# Patient Record
Sex: FEMALE | Race: ASIAN | NOT HISPANIC OR LATINO | Employment: FULL TIME
[De-identification: names, ages, dates, MRNs, and addresses within clinical notes are randomized per-mention and may not be internally consistent; named-entity substitution may affect disease eponyms.]

---

## 2018-04-02 ENCOUNTER — HEALTH MAINTENANCE LETTER (OUTPATIENT)
Age: 58
End: 2018-04-02

## 2018-04-09 ENCOUNTER — OFFICE VISIT (OUTPATIENT)
Dept: FAMILY MEDICINE | Facility: CLINIC | Age: 58
End: 2018-04-09
Payer: COMMERCIAL

## 2018-04-09 VITALS
DIASTOLIC BLOOD PRESSURE: 81 MMHG | WEIGHT: 128 LBS | HEIGHT: 60 IN | BODY MASS INDEX: 25.13 KG/M2 | HEART RATE: 92 BPM | OXYGEN SATURATION: 100 % | SYSTOLIC BLOOD PRESSURE: 139 MMHG | TEMPERATURE: 98.5 F

## 2018-04-09 DIAGNOSIS — I10 HYPERTENSION, UNSPECIFIED TYPE: ICD-10-CM

## 2018-04-09 DIAGNOSIS — E11.9 TYPE 2 DIABETES MELLITUS WITHOUT COMPLICATION, WITHOUT LONG-TERM CURRENT USE OF INSULIN (H): Primary | ICD-10-CM

## 2018-04-09 DIAGNOSIS — Z12.11 SCREEN FOR COLON CANCER: ICD-10-CM

## 2018-04-09 DIAGNOSIS — Z12.31 VISIT FOR SCREENING MAMMOGRAM: ICD-10-CM

## 2018-04-09 DIAGNOSIS — E78.2 MIXED HYPERLIPIDEMIA: ICD-10-CM

## 2018-04-09 DIAGNOSIS — Z11.59 NEED FOR HEPATITIS C SCREENING TEST: ICD-10-CM

## 2018-04-09 LAB
ALBUMIN SERPL-MCNC: 3.8 G/DL (ref 3.4–5)
ALP SERPL-CCNC: 53 U/L (ref 40–150)
ALT SERPL W P-5'-P-CCNC: 22 U/L (ref 0–50)
ANION GAP SERPL CALCULATED.3IONS-SCNC: 5 MMOL/L (ref 3–14)
AST SERPL W P-5'-P-CCNC: 16 U/L (ref 0–45)
BILIRUB SERPL-MCNC: 0.3 MG/DL (ref 0.2–1.3)
BUN SERPL-MCNC: 19 MG/DL (ref 7–30)
CALCIUM SERPL-MCNC: 9.3 MG/DL (ref 8.5–10.1)
CHLORIDE SERPL-SCNC: 104 MMOL/L (ref 94–109)
CO2 SERPL-SCNC: 30 MMOL/L (ref 20–32)
CREAT SERPL-MCNC: 0.53 MG/DL (ref 0.52–1.04)
CREAT UR-MCNC: 91 MG/DL
GFR SERPL CREATININE-BSD FRML MDRD: >90 ML/MIN/1.7M2
GLUCOSE BLD-MCNC: 218 MG/DL (ref 70–99)
GLUCOSE SERPL-MCNC: 218 MG/DL (ref 70–99)
HBA1C MFR BLD: 9.2 % (ref 0–6.4)
HCV AB SERPL QL IA: NONREACTIVE
MICROALBUMIN UR-MCNC: 17 MG/L
MICROALBUMIN/CREAT UR: 18.39 MG/G CR (ref 0–25)
POTASSIUM SERPL-SCNC: 4.3 MMOL/L (ref 3.4–5.3)
PROT SERPL-MCNC: 7.4 G/DL (ref 6.8–8.8)
SODIUM SERPL-SCNC: 139 MMOL/L (ref 133–144)
TSH SERPL DL<=0.005 MIU/L-ACNC: 0.4 MU/L (ref 0.4–4)

## 2018-04-09 PROCEDURE — 99204 OFFICE O/P NEW MOD 45 MIN: CPT | Performed by: INTERNAL MEDICINE

## 2018-04-09 PROCEDURE — 36415 COLL VENOUS BLD VENIPUNCTURE: CPT | Performed by: INTERNAL MEDICINE

## 2018-04-09 PROCEDURE — 86803 HEPATITIS C AB TEST: CPT | Performed by: INTERNAL MEDICINE

## 2018-04-09 PROCEDURE — 83036 HEMOGLOBIN GLYCOSYLATED A1C: CPT | Performed by: INTERNAL MEDICINE

## 2018-04-09 PROCEDURE — 80053 COMPREHEN METABOLIC PANEL: CPT | Performed by: INTERNAL MEDICINE

## 2018-04-09 PROCEDURE — 80061 LIPID PANEL: CPT | Performed by: INTERNAL MEDICINE

## 2018-04-09 PROCEDURE — 84443 ASSAY THYROID STIM HORMONE: CPT | Performed by: INTERNAL MEDICINE

## 2018-04-09 PROCEDURE — 82043 UR ALBUMIN QUANTITATIVE: CPT | Performed by: INTERNAL MEDICINE

## 2018-04-09 PROCEDURE — 82947 ASSAY GLUCOSE BLOOD QUANT: CPT | Mod: 59 | Performed by: INTERNAL MEDICINE

## 2018-04-09 RX ORDER — GLYBURIDE-METFORMIN HYDROCHLORIDE 5; 500 MG/1; MG/1
1 TABLET ORAL 2 TIMES DAILY WITH MEALS
Qty: 180 TABLET | Refills: 1 | Status: SHIPPED | OUTPATIENT
Start: 2018-04-09 | End: 2018-10-10

## 2018-04-09 RX ORDER — SIMVASTATIN 10 MG
10 TABLET ORAL
COMMUNITY
Start: 2017-09-29 | End: 2018-10-10

## 2018-04-09 RX ORDER — LISINOPRIL 10 MG/1
TABLET ORAL
Refills: 0 | COMMUNITY
Start: 2017-12-20 | End: 2018-04-10

## 2018-04-09 RX ORDER — INSULIN PUMP SYRINGE, 3 ML
EACH MISCELLANEOUS
COMMUNITY
Start: 2017-06-09

## 2018-04-09 RX ORDER — GLYBURIDE-METFORMIN HYDROCHLORIDE 5; 500 MG/1; MG/1
1 TABLET ORAL 2 TIMES DAILY WITH MEALS
COMMUNITY
End: 2018-04-10

## 2018-04-09 ASSESSMENT — PAIN SCALES - GENERAL: PAINLEVEL: NO PAIN (0)

## 2018-04-09 NOTE — MR AVS SNAPSHOT
After Visit Summary   4/9/2018    Alfa Sung    MRN: [id number]           Patient Information     Date Of Birth          1960        Visit Information        Provider Department      4/9/2018 7:25 AM Lenard Reid MD; [employer] [facility]        Today's Diagnoses     Type 2 diabetes mellitus without complication, without long-term current use of insulin (H)    -  1    Hypertension, unspecified type        Screen for colon cancer        Visit for screening mammogram        Need for hepatitis C screening test        Need for prophylactic vaccination with tetanus-diphtheria (TD)           Follow-ups after your visit        Follow-up notes from your care team     Return in about 3 months (around 7/9/2018).      Future tests that were ordered for you today     Open Future Orders        Priority Expected Expires Ordered    Fecal colorectal cancer screen FIT - Future (S+30) Routine 4/30/2018 5/9/2018 4/9/2018            Who to contact     If you have questions or need follow up information about today's clinic visit or your schedule please contact [facility] directly at [phone].  Normal or non-critical lab and imaging results will be communicated to you by [employer], letter or phone within 4 business days after the clinic has received the results. If you do not hear from us within 7 days, please contact the clinic through [employer] or phone. If you have a critical or abnormal lab result, we will notify you by phone as soon as possible.  Submit refill requests through [employer] or call your pharmacy and they will forward the refill request to us. Please allow 3 business days for your refill to be completed.          Additional Information About Your Visit        MyChart Information     [employer] lets you send messages to your doctor, view your test results, renew your prescriptions, schedule appointments and more. To sign up, go to  [email or web address] . Click on \"Log in\" on the left side of the screen, which will take you to the Welcome page. Then click on \"Sign up Now\" on the right side of the page.     You will be asked to enter the access code listed below, as well as some personal information. Please follow the directions to create your username and password.     Your access code is: [email or web address]  Expires: 2018  8:18 AM     Your access code will  in 90 days. If you need help or a new code, please call your [place] clinic or [phone].        Care EveryWhere ID     This is your Care EveryWhere ID. This could be used by other organizations to access your [place] medical records  [id number]        Your Vitals Were     Pulse Temperature Height Pulse Oximetry Breastfeeding? BMI (Body Mass Index)    92 98.5  F (36.9  C) (Oral) 5' (1.524 m) 100% No 25 kg/m2       Blood Pressure from Last 3 Encounters:   18 139/81    Weight from Last 3 Encounters:   18 128 lb (58.1 kg)              We Performed the Following     Albumin Random Urine Quantitative with Creat Ratio     Comprehensive metabolic panel     Glucose whole blood     Hemoglobin A1c     Hepatitis C Screen Reflex to HCV RNA Quant and Genotype     TSH with free T4 reflex          Today's Medication Changes          These changes are accurate as of 18  8:18 AM.  If you have any questions, ask your nurse or doctor.               These medicines have changed or have updated prescriptions.        Dose/Directions    * glyBURIDE-metFORMIN 5-500 MG per tablet   Commonly known as:  GLUCOVANCE   This may have changed:  Another medication with the same name was added. Make sure you understand how and when to take each.   Changed by:  Lenard Reid MD        Dose:  1 tablet   Take 1 tablet by mouth 2 times daily (with meals)   Refills:  0       * glyBURIDE-metFORMIN 5-500 MG per tablet   Commonly known as:  GLUCOVANCE   This may have changed:  You were already " taking a medication with the same name, and this prescription was added. Make sure you understand how and when to take each.   Used for:  Type 2 diabetes mellitus without complication, without long-term current use of insulin (H)   Changed by:  Lenard Reid MD        Dose:  1 tablet   Take 1 tablet by mouth 2 times daily (with meals)   Quantity:  180 tablet   Refills:  1       * Notice:  This list has 2 medication(s) that are the same as other medications prescribed for you. Read the directions carefully, and ask your doctor or other care provider to review them with you.      Stop taking these medicines if you haven't already. Please contact your care team if you have questions.     metFORMIN 1000 MG tablet   Commonly known as:  GLUCOPHAGE   Stopped by:  Lenard Reid MD                Where to get your medicines      These medications were sent to [facility] [id number] [place] [address] [id number] [place] [address] & [facility]  [address] [facility] [address]    Hours:  24-hours Phone:  [phone]     glyBURIDE-metFORMIN 5-500 MG per tablet                Primary Care Provider Office Phone # Fax #    Lenard Reid [phone] [address] [facility] [address]        Equal Access to Services     [facility] HUMZA AH: Hadii thompson ku hadasho Soomaali, waaxda luqadaha, qaybta kaalmada adeegyada, waxay ruby haysadie hernandez. So [facility] [phone].    ATENCIÓN: Si habla español, tiene a new disposición servicios gratuitos de asistencia lingüística. Llame al [phone].    We comply with applicable federal civil rights laws and [place] laws. We do not discriminate on the basis of race, color, national origin, age, disability, sex, sexual orientation, or gender identity.            Thank you!     Thank you for choosing [facility]  for your care. Our goal is always to provide you with excellent care.  Hearing back from our patients is one way we can continue to improve our services. Please take a few minutes to complete the written survey that you may receive in the mail after your visit with us. Thank you!             Your Updated Medication List - Protect others around you: Learn how to safely use, store and throw away your medicines at www.disposemymeds.org.          This list is accurate as of 4/9/18  8:18 AM.  Always use your most recent med list.                   Brand Name Dispense Instructions for use Diagnosis    ASPIRIN PO      Take 81 mg by mouth daily (with dinner)        FIFTY50 GLUCOSE METER 2.0 W/DEVICE Kit      Dispense meter, test strips, lancets covered by pt ins. E11.65 NIDDM type II, uncontrolled - Test 3 times/day, Reason: High A1C        * glyBURIDE-metFORMIN 5-500 MG per tablet    GLUCOVANCE     Take 1 tablet by mouth 2 times daily (with meals)        * glyBURIDE-metFORMIN 5-500 MG per tablet    GLUCOVANCE    180 tablet    Take 1 tablet by mouth 2 times daily (with meals)    Type 2 diabetes mellitus without complication, without long-term current use of insulin (H)       lisinopril 10 MG tablet    PRINIVIL/ZESTRIL     TK 1 T PO ONCE D        ONETOUCH ULTRA test strip   Generic drug:  blood glucose monitoring      Dispense item covered by pt ins. E11.9 NIDDM type II - Test 2 times/day. Reason: High A1C        simvastatin 10 MG tablet    ZOCOR     Take 10 mg by mouth        * Notice:  This list has 2 medication(s) that are the same as other medications prescribed for you. Read the directions carefully, and ask your doctor or other care provider to review them with you.

## 2018-04-09 NOTE — PROGRESS NOTES
SUBJECTIVE:   Alfa Sung is a 57 year old female who presents to clinic today for the following health issues:    Diabetes Follow-up    Patient is checking blood sugars: once daily.  Results are as follows:         am - 120-153    Diabetic concerns: None     Symptoms of hypoglycemia (low blood sugar): none     Paresthesias (numbness or burning in feet) or sores: Yes      Date of last diabetic eye exam: 2017    BP Readings from Last 2 Encounters:   No data found for BP     No results found for: A1C, LDL    Amount of exercise or physical activity: 4-5 days/week for an average of 45-60 minutes    Problems taking medications regularly: No    Medication side effects: none    Diet: regular (no restrictions)    Date of diagnosis: > 10 years ago.     Hypertension Follow-up      Outpatient blood pressures monitoring: no    Low Salt Diet: no    Adverse effects: none from Lisinopril    Compliance: good    Secondary causes: none    Chronic kidney disease: no    Hyperthyroidism: no    Anxiety: no    Decongestants: no    Substance abuse: no    Diabetes: yes    Ischemic heart disease: no    Stroke: no    Hyperlipidemia: yes     Hyperlipidemia Follow-Up      Rate your low fat/cholesterol diet?: not monitoring fat    Taking statin?  Yes, no muscle aches from statin    Other lipid medications/supplements?:  none    Amount of exercise or physical activity: None    Problems taking medications regularly: No    Medication side effects: none    Diet: regular (no restrictions)          Patient Active Problem List   Diagnosis     Diabetes mellitus, type II (H)     HTN (hypertension)     Mixed hyperlipidemia     Primary osteoarthritis of left knee     History reviewed. No pertinent surgical history.    Social History   Substance Use Topics     Smoking status: Never Smoker     Smokeless tobacco: Never Used     Alcohol use No     History reviewed. No pertinent family history.      Not on File  Recent Labs   Lab Test  04/09/18   0741   A1C   9.2*   ALT  22   CR  0.53   GFRESTIMATED  >90   GFRESTBLACK  >90   POTASSIUM  4.3   TSH  0.40      BP Readings from Last 3 Encounters:   04/09/18 139/81    Wt Readings from Last 3 Encounters:   04/09/18 128 lb (58.1 kg)                       ROS:  CONSTITUTIONAL: NEGATIVE for fever, chills, change in weight  INTEGUMENTARY/SKIN: NEGATIVE for worrisome rashes, moles or lesions  EYES: NEGATIVE for vision changes or irritation  ENT/MOUTH: NEGATIVE for ear, mouth and throat problems  RESP: NEGATIVE for significant cough or SOB  BREAST: NEGATIVE for masses, tenderness or discharge  CV: NEGATIVE for chest pain, palpitations or peripheral edema  GI: NEGATIVE for nausea, abdominal pain, heartburn, or change in bowel habits  : NEGATIVE for frequency, dysuria, or hematuria  MUSCULOSKELETAL: NEGATIVE for significant arthralgias or myalgia  NEURO: NEGATIVE for weakness, dizziness or paresthesias  ENDOCRINE: NEGATIVE for temperature intolerance, skin/hair changes  HEME: NEGATIVE for bleeding problems  PSYCHIATRIC: NEGATIVE for changes in mood or affect    OBJECTIVE:     /81 (BP Location: Left arm, Patient Position: Chair, Cuff Size: Adult Regular)  Pulse 92  Temp 98.5  F (36.9  C) (Oral)  Ht 5' (1.524 m)  Wt 128 lb (58.1 kg)  SpO2 100%  Breastfeeding? No  BMI 25 kg/m2  Body mass index is 25 kg/(m^2).  GENERAL: healthy, alert and no distress  EYES: Eyes grossly normal to inspection, PERRL and conjunctivae and sclerae normal  HENT: ear canals and TM's normal, nose and mouth without ulcers or lesions  NECK: no adenopathy, no asymmetry, masses, or scars and thyroid normal to palpation  RESP: lungs clear to auscultation - no rales, rhonchi or wheezes  CV: regular rate and rhythm, normal S1 S2, no S3 or S4, no murmur, click or rub, no peripheral edema and peripheral pulses strong  ABDOMEN: soft, nontender, no hepatosplenomegaly, no masses and bowel sounds normal  MS: no gross musculoskeletal defects noted, no  edema  SKIN: no suspicious lesions or rashes  NEURO: Normal strength and tone, mentation intact and speech normal  PSYCH: mentation appears normal, affect normal/bright    Diagnostic Test Results:  Results for orders placed or performed in visit on 04/09/18   Hepatitis C Screen Reflex to HCV RNA Quant and Genotype   Result Value Ref Range    Hepatitis C Antibody Nonreactive NR^Nonreactive   Comprehensive metabolic panel   Result Value Ref Range    Sodium 139 133 - 144 mmol/L    Potassium 4.3 3.4 - 5.3 mmol/L    Chloride 104 94 - 109 mmol/L    Carbon Dioxide 30 20 - 32 mmol/L    Anion Gap 5 3 - 14 mmol/L    Glucose 218 (H) 70 - 99 mg/dL    Urea Nitrogen 19 7 - 30 mg/dL    Creatinine 0.53 0.52 - 1.04 mg/dL    GFR Estimate >90 >60 mL/min/1.7m2    GFR Estimate If Black >90 >60 mL/min/1.7m2    Calcium 9.3 8.5 - 10.1 mg/dL    Bilirubin Total 0.3 0.2 - 1.3 mg/dL    Albumin 3.8 3.4 - 5.0 g/dL    Protein Total 7.4 6.8 - 8.8 g/dL    Alkaline Phosphatase 53 40 - 150 U/L    ALT 22 0 - 50 U/L    AST 16 0 - 45 U/L   Glucose whole blood   Result Value Ref Range    Glucose Whole Blood 218 (H) 70 - 99 mg/dL   Hemoglobin A1c   Result Value Ref Range    Hemoglobin A1C 9.2 (H) 0 - 6.4 %   Albumin Random Urine Quantitative with Creat Ratio   Result Value Ref Range    Creatinine Urine 91 mg/dL    Albumin Urine mg/L 17 mg/L    Albumin Urine mg/g Cr 18.39 0 - 25 mg/g Cr   TSH with free T4 reflex   Result Value Ref Range    TSH 0.40 0.40 - 4.00 mU/L       ASSESSMENT/PLAN:     (E11.9) Type 2 diabetes mellitus without complication, without long-term current use of insulin (H)  (primary encounter diagnosis)  Comment:   Plan: Comprehensive metabolic panel, Glucose whole         blood, Hemoglobin A1c, Albumin Random Urine         Quantitative with Creat Ratio, TSH with free T4        reflex, glyBURIDE-metFORMIN (GLUCOVANCE) 5-500         MG per tablet, glyBURIDE-metFORMIN (GLUCOVANCE)        5-500 MG per tablet            (I10) Hypertension,  unspecified type  Comment:   Plan: Comprehensive metabolic panel, Albumin Random         Urine Quantitative with Creat Ratio, lisinopril        (PRINIVIL/ZESTRIL) 10 MG tablet            (E78.2) Mixed hyperlipidemia  Comment:   Plan: simvastatin (ZOCOR) 10 MG tablet, Lipid Profile        (Chol, Trig, HDL, LDL calc)            (Z12.11) Screen for colon cancer  Comment:   Plan: Fecal colorectal cancer screen FIT - Future         (S+30)            (Z12.31) Visit for screening mammogram  Comment:   Plan: *MA Screening Digital Bilateral            (Z11.59) Need for hepatitis C screening test  Comment:   Plan: Hepatitis C Screen Reflex to HCV RNA Quant and         Genotype, Hepatitis C antibody              FUTURE APPOINTMENTS:       - Follow-up visit in 3 months with labs prior to visit.    Lenard Reid MD  [facility]

## 2018-04-10 LAB
CHOLEST SERPL-MCNC: 212 MG/DL
HDLC SERPL-MCNC: 68 MG/DL
LDLC SERPL CALC-MCNC: 132 MG/DL
NONHDLC SERPL-MCNC: 144 MG/DL
TRIGL SERPL-MCNC: 59 MG/DL

## 2018-04-10 RX ORDER — LISINOPRIL 10 MG/1
TABLET ORAL
Qty: 90 TABLET | Refills: 0 | Status: SHIPPED | OUTPATIENT
Start: 2018-04-10 | End: 2018-10-10

## 2018-04-16 DIAGNOSIS — Z12.11 SCREEN FOR COLON CANCER: ICD-10-CM

## 2018-04-16 LAB — HEMOCCULT STL QL IA: NEGATIVE

## 2018-04-16 PROCEDURE — 82274 ASSAY TEST FOR BLOOD FECAL: CPT | Performed by: INTERNAL MEDICINE

## 2018-04-16 NOTE — LETTER
April 17, 2018      Alfa Sung  [address] [facility] [address]        Dear Mrs. Sung,                                  Your stool occult test is negative, which indicates low risk of colon cancer. No reason to undergo colonoscopy. Let's repeat this test next year. For any questions, you may call my office at [phone].     Sincerely,     Lenard Reid MD   Internal Medicine     Resulted Orders   Fecal colorectal cancer screen FIT - Future (S+30)   Result Value Ref Range    Occult Blood Scn FIT Negative NEG^Negative

## 2018-05-16 ENCOUNTER — OFFICE VISIT (OUTPATIENT)
Dept: OPTOMETRY | Facility: CLINIC | Age: 58
End: 2018-05-16
Payer: COMMERCIAL

## 2018-05-16 DIAGNOSIS — H52.203 HYPEROPIA OF BOTH EYES WITH ASTIGMATISM AND PRESBYOPIA: Primary | ICD-10-CM

## 2018-05-16 DIAGNOSIS — H52.4 HYPEROPIA OF BOTH EYES WITH ASTIGMATISM AND PRESBYOPIA: Primary | ICD-10-CM

## 2018-05-16 DIAGNOSIS — H11.001 PTERYGIUM EYE, RIGHT: ICD-10-CM

## 2018-05-16 DIAGNOSIS — E11.3293 TYPE 2 DIABETES MELLITUS WITH BOTH EYES AFFECTED BY MILD NONPROLIFERATIVE RETINOPATHY WITHOUT MACULAR EDEMA, WITHOUT LONG-TERM CURRENT USE OF INSULIN (H): ICD-10-CM

## 2018-05-16 DIAGNOSIS — H52.03 HYPEROPIA OF BOTH EYES WITH ASTIGMATISM AND PRESBYOPIA: Primary | ICD-10-CM

## 2018-05-16 DIAGNOSIS — Z96.1 PSEUDOPHAKIA: ICD-10-CM

## 2018-05-16 PROCEDURE — 92004 COMPRE OPH EXAM NEW PT 1/>: CPT | Performed by: OPTOMETRIST

## 2018-05-16 PROCEDURE — 92015 DETERMINE REFRACTIVE STATE: CPT | Performed by: OPTOMETRIST

## 2018-05-16 ASSESSMENT — CONF VISUAL FIELD
METHOD: COUNTING FINGERS
OD_NORMAL: 1
OS_NORMAL: 1

## 2018-05-16 ASSESSMENT — REFRACTION_MANIFEST
OD_AXIS: 100
OD_CYLINDER: +5.00
OS_SPHERE: -3.50
OD_SPHERE: -3.75
OS_ADD: +2.50
OS_CYLINDER: +4.50
OD_ADD: +2.50
OD_AXIS: 091
METHOD_AUTOREFRACTION: 1
OS_SPHERE: -4.00
OS_AXIS: 085
OD_SPHERE: -4.25
OS_CYLINDER: +4.50
OD_CYLINDER: +5.75
OS_AXIS: 088

## 2018-05-16 ASSESSMENT — REFRACTION_WEARINGRX
OS_SPHERE: -4.00
SPECS_TYPE: BIFOCAL
OS_CYLINDER: +5.00
OS_AXIS: 086
OD_CYLINDER: +5.25
OD_AXIS: 100
OD_ADD: +2.50
OS_ADD: +2.50
OD_SPHERE: -3.75

## 2018-05-16 ASSESSMENT — VISUAL ACUITY
METHOD: SNELLEN - LINEAR
OS_CC: 20/25
CORRECTION_TYPE: GLASSES
OD_SC: 20/150
OS_CC: 20/40-1
OD_CC+: -2
OS_SC: 20/150
OD_CC: 20/40
OD_CC: 20/40

## 2018-05-16 ASSESSMENT — TONOMETRY
OS_IOP_MMHG: 23
IOP_METHOD: TONOPEN
OD_IOP_MMHG: 23

## 2018-05-16 ASSESSMENT — CUP TO DISC RATIO
OD_RATIO: 0.3
OS_RATIO: 0.3

## 2018-05-16 ASSESSMENT — SLIT LAMP EXAM - LIDS
COMMENTS: PERMANENT EYELINER
COMMENTS: PERMANENT EYELINER

## 2018-05-16 ASSESSMENT — EXTERNAL EXAM - RIGHT EYE: OD_EXAM: NORMAL

## 2018-05-16 ASSESSMENT — EXTERNAL EXAM - LEFT EYE: OS_EXAM: NORMAL

## 2018-05-16 NOTE — LETTER
5/16/2018         RE: Alfa Sung  [address] [facility] [address]        Dear Colleague,    Thank you for referring your patient, Alfa Sung, to the [facility]. Please see a copy of my visit note below.    Chief Complaint   Patient presents with     Eye Exam For Diabetes     Accompanied by   Lab Results   Component Value Date    A1C 9.2 04/09/2018       Last Eye Exam: 3 years ago  Dilated Previously: Yes    What are you currently using to see?  glasses    Distance Vision Acuity: Noticed gradual change in both eyes    Near Vision Acuity: Not satisfied     Eye Comfort: red eyes  Do you use eye drops? : Yes: artificial tears x2 a day  Occupation or Hobbies: workout - dancing - works in [employer]       Medical, surgical and family histories reviewed and updated 5/16/2018.       OBJECTIVE: See Ophthalmology exam    ASSESSMENT:    ICD-10-CM    1. Hyperopia of both eyes with astigmatism and presbyopia H52.03 EYE EXAM (SIMPLE-NONBILLABLE)    H52.203 REFRACTION    H52.4    2. Pterygium eye, right H11.001 EYE EXAM (SIMPLE-NONBILLABLE)     OPHTHALMOLOGY ADULT REFERRAL   3. Pseudophakia Z96.1 EYE EXAM (SIMPLE-NONBILLABLE)   4. Type 2 diabetes mellitus with both eyes affected by mild nonproliferative retinopathy without macular edema, without long-term current use of insulin (H) E11.3293 EYE EXAM (SIMPLE-NONBILLABLE)      PLAN:    Alfa Sung aware  eye exam results will be sent to Lenard Reid.  Patient Instructions   Referral for pterygium evaluation of the right eye. Keep eyes moist with artifical tears and protect eyes from sun, wind and dust.    You have very mild diabetic eye disease. Keep blood sugar levels under good control and return yearly for eye exams.    Your eyes may be blurry at near and sensitive to light for several hours from the dilating drops.    Monitor IOP yearly. No family hx of glaucoma with 23 IOP today and healthy  looking nerves.           Again, thank you for allowing me to participate in the care of your patient.        Sincerely,        Spring Alvarado OD

## 2018-05-16 NOTE — PROGRESS NOTES
Chief Complaint   Patient presents with     Eye Exam For Diabetes     Accompanied by   Lab Results   Component Value Date    A1C 9.2 04/09/2018       Last Eye Exam: 3 years ago  Dilated Previously: Yes    What are you currently using to see?  glasses    Distance Vision Acuity: Noticed gradual change in both eyes    Near Vision Acuity: Not satisfied     Eye Comfort: red eyes  Do you use eye drops? : Yes: artificial tears x2 a day  Occupation or Hobbies: workout - dancing - works in [employer]       Medical, surgical and family histories reviewed and updated 5/16/2018.       OBJECTIVE: See Ophthalmology exam    ASSESSMENT:    ICD-10-CM    1. Hyperopia of both eyes with astigmatism and presbyopia H52.03 EYE EXAM (SIMPLE-NONBILLABLE)    H52.203 REFRACTION    H52.4    2. Pterygium eye, right H11.001 EYE EXAM (SIMPLE-NONBILLABLE)     OPHTHALMOLOGY ADULT REFERRAL   3. Pseudophakia Z96.1 EYE EXAM (SIMPLE-NONBILLABLE)   4. Type 2 diabetes mellitus with both eyes affected by mild nonproliferative retinopathy without macular edema, without long-term current use of insulin (H) E11.3293 EYE EXAM (SIMPLE-NONBILLABLE)      PLAN:    Alfa Sung aware  eye exam results will be sent to Lenard Reid.  Patient Instructions   Referral for pterygium evaluation of the right eye. Keep eyes moist with artifical tears and protect eyes from sun, wind and dust.    You have very mild diabetic eye disease. Keep blood sugar levels under good control and return yearly for eye exams.    Your eyes may be blurry at near and sensitive to light for several hours from the dilating drops.    Monitor IOP yearly. No family hx of glaucoma with 23 IOP today and healthy looking nerves.

## 2018-05-16 NOTE — PATIENT INSTRUCTIONS
Referral for pterygium evaluation of the right eye. Keep eyes moist with artifical tears and protect eyes from sun, wind and dust.    You have very mild diabetic eye disease. Keep blood sugar levels under good control and return yearly for eye exams.    Your eyes may be blurry at near and sensitive to light for several hours from the dilating drops.    Monitor IOP yearly. No family hx of glaucoma with 23 IOP today and healthy looking nerves.

## 2018-05-16 NOTE — MR AVS SNAPSHOT
After Visit Summary   5/16/2018    Alfa Sung    MRN: [id number]           Patient Information     Date Of Birth          1960        Visit Information        Provider Department      5/16/2018 10:45 AM Spring Alvarado, OD; [employer] [facility]        Today's Diagnoses     Hyperopia of both eyes with astigmatism and presbyopia    -  1    Pterygium eye, right        Pseudophakia           Follow-ups after your visit        Your next 10 appointments already scheduled     May 30, 2018  8:30 AM CDT   NEW CORNEA with Oren Guerra MD   Eye Clinic ([facility])    [address]  9[address] Clin 9a  [facility] [address] [phone]            Jul 09, 2018  7:00 AM CDT   Office Visit with Lenard Reid MD   [facility] ([facility])    [address] [facility] [address] [phone]           Bring a current list of meds and any records pertaining to this visit. For Physicals, please bring immunization records and any forms needing to be filled out. Please arrive 10 minutes early to complete paperwork.              Who to contact     If you have questions or need follow up information about today's clinic visit or your schedule please contact [facility] directly at [phone].  Normal or non-critical lab and imaging results will be communicated to you by MyChart, letter or phone within 4 business days after the clinic has received the results. If you do not hear from us within 7 days, please contact the clinic through MyChart or phone. If you have a critical or abnormal lab result, we will notify you by phone as soon as possible.  Submit refill requests through [employer] or call your pharmacy and they will forward the refill request to us. Please allow 3 business days for your refill to be completed.          Additional  "Information About Your Visit        MyChart Information     [employer] lets you send messages to your doctor, view your test results, renew your prescriptions, schedule appointments and more. To sign up, go to [email or web address] . Click on \"Log in\" on the left side of the screen, which will take you to the Welcome page. Then click on \"Sign up Now\" on the right side of the page.     You will be asked to enter the access code listed below, as well as some personal information. Please follow the directions to create your username and password.     Your access code is: [email or web address]  Expires: 2018  8:18 AM     Your access code will  in 90 days. If you need help or a new code, please call your [place] clinic or [phone].        Care EveryWhere ID     This is your Care EveryWhere ID. This could be used by other organizations to access your [place] medical records  [id number]         Blood Pressure from Last 3 Encounters:   18 139/81    Weight from Last 3 Encounters:   18 58.1 kg (128 lb)              Today, you had the following     No orders found for display       Primary Care Provider Office Phone # Fax #    Lenard Reid [phone]       [address]  [facility] [address]        Equal Access to Services     [facility] : Hadii aad ku hadasho Soomaali, waaxda luqadaha, qaybta kaalmada adeegyada, waldo couch . So [facility] [phone].    ATENCIÓN: Si habla español, tiene a new disposición servicios gratuitos de asistencia lingüística. Llaguilar al [phone].    We comply with applicable federal civil rights laws and [place] laws. We do not discriminate on the basis of race, color, national origin, age, disability, sex, sexual orientation, or gender identity.            Thank you!     Thank you for choosing [facility]  for your care. Our goal is always to provide you with excellent care. Hearing back from our " patients is one way we can continue to improve our services. Please take a few minutes to complete the written survey that you may receive in the mail after your visit with us. Thank you!             Your Updated Medication List - Protect others around you: Learn how to safely use, store and throw away your medicines at www.disposemymeds.org.          This list is accurate as of 5/16/18 12:06 PM.  Always use your most recent med list.                   Brand Name Dispense Instructions for use Diagnosis    ASPIRIN PO      Take 81 mg by mouth daily (with dinner)        FIFTY50 GLUCOSE METER 2.0 w/Device Kit      Dispense meter, test strips, lancets covered by pt ins. E11.65 NIDDM type II, uncontrolled - Test 3 times/day, Reason: High A1C        glyBURIDE-metFORMIN 5-500 MG per tablet    GLUCOVANCE    180 tablet    Take 1 tablet by mouth 2 times daily (with meals)    Type 2 diabetes mellitus without complication, without long-term current use of insulin (H)       lisinopril 10 MG tablet    PRINIVIL/ZESTRIL    90 tablet    TK 1 T PO ONCE D    Hypertension, unspecified type       ONETOUCH ULTRA test strip   Generic drug:  blood glucose monitoring      Dispense item covered by pt ins. E11.9 NIDDM type II - Test 2 times/day. Reason: High A1C        simvastatin 10 MG tablet    ZOCOR     Take 10 mg by mouth    Mixed hyperlipidemia

## 2018-05-25 DIAGNOSIS — H11.001 PTERYGIUM EYE, RIGHT: Primary | ICD-10-CM

## 2018-05-30 ENCOUNTER — OFFICE VISIT (OUTPATIENT)
Dept: OPHTHALMOLOGY | Facility: CLINIC | Age: 58
End: 2018-05-30
Attending: OPHTHALMOLOGY
Payer: COMMERCIAL

## 2018-05-30 ENCOUNTER — TELEPHONE (OUTPATIENT)
Dept: OPHTHALMOLOGY | Facility: CLINIC | Age: 58
End: 2018-05-30

## 2018-05-30 DIAGNOSIS — H52.13 MYOPIA OF BOTH EYES WITH ASTIGMATISM: ICD-10-CM

## 2018-05-30 DIAGNOSIS — Z96.1 PSEUDOPHAKIA OF BOTH EYES: ICD-10-CM

## 2018-05-30 DIAGNOSIS — H11.001 PTERYGIUM, RIGHT: Primary | ICD-10-CM

## 2018-05-30 DIAGNOSIS — H52.203 MYOPIA OF BOTH EYES WITH ASTIGMATISM: ICD-10-CM

## 2018-05-30 PROCEDURE — 92025 CPTRIZED CORNEAL TOPOGRAPHY: CPT | Mod: ZF | Performed by: OPHTHALMOLOGY

## 2018-05-30 PROCEDURE — G0463 HOSPITAL OUTPT CLINIC VISIT: HCPCS | Mod: ZF

## 2018-05-30 ASSESSMENT — VISUAL ACUITY
OS_CC+: -2
OD_CC+: -1
METHOD: SNELLEN - LINEAR
OS_CC: 20/30
OD_CC: 20/25
CORRECTION_TYPE: GLASSES

## 2018-05-30 ASSESSMENT — REFRACTION_WEARINGRX
OS_ADD: +2.50
OD_CYLINDER: +5.25
OS_AXIS: 086
OD_SPHERE: -3.75
OD_AXIS: 100
OD_ADD: +2.50
SPECS_TYPE: BIFOCAL
OS_CYLINDER: +5.00
OS_SPHERE: -4.00

## 2018-05-30 ASSESSMENT — TONOMETRY
OD_IOP_MMHG: 20
OS_IOP_MMHG: 20
IOP_METHOD: ICARE

## 2018-05-30 ASSESSMENT — SLIT LAMP EXAM - LIDS
COMMENTS: PERMANENT EYELINER
COMMENTS: PERMANENT EYELINER

## 2018-05-30 ASSESSMENT — EXTERNAL EXAM - RIGHT EYE: OD_EXAM: NORMAL

## 2018-05-30 ASSESSMENT — CONF VISUAL FIELD
METHOD: COUNTING FINGERS
OD_NORMAL: 1
OS_NORMAL: 1

## 2018-05-30 ASSESSMENT — EXTERNAL EXAM - LEFT EYE: OS_EXAM: NORMAL

## 2018-05-30 NOTE — PROGRESS NOTES
CC -   Pterygium eval    INTERVAL HISTORY - Initial visit    HPI -   Alfa Sung is a  57 year old year-old patient presenting for pterygium evaluation. She notes constant redness. Uses artificial tears with minor relief. Denies vision change. Denies itching. Denies irritation. Denies flashes or floaters.       PAST OCULAR SURGERY  CE/IOL both eyes 2014 (Dr. Garza)    OCULAR MEDICATIONS:  AT TID      ASSESSMENT & PLAN   1) pterygium right eye   - significant with regular astigmatism on gisselle   - r/b/a discussed   -patient elects to proceed with surgery    2) Pseudophakia both eyes   - continue to observe   - may need new manifest refraction after pterygium excision    3) forme fruste keratoconus   May be misleading due to pterygium and lid position on gisselle    4) T2DM   -with mild nonproliferative DIABETIC RETINOPATHY seen on 5/16/18 exam with Dr. Alvarado    return to clinic: post op    Denton Ren MD  PGY3, Dept of Ophthalmology  Pager [phone]    ~~~~~~~~~~~~~~~~~~~~~~~~~~~~~~~~~~~~~~~~~~~~~~~~~~~~~~~~~~~~~~~~    Complete documentation of historical and exam elements from today's encounter can be found in the full encounter summary report (not reduplicated in this progress note). I personally obtained the chief complaint(s) and history of present illness.  I confirmed and edited as necessary the review of systems, past medical/surgical history, family history, social history, and examination findings as documented by others; and I examined the patient myself. I personally reviewed the relevant tests, images, and reports as documented above. I formulated and edited as necessary the assessment and plan and discussed the findings and management plan with the patient and family.    I personally viewed the [imaging] and I agree with the interpretation as documented by the resident/fellow and edited by me as appropriate.    Oren Guerra MD

## 2018-05-30 NOTE — NURSING NOTE
Chief Complaints and History of Present Illnesses   Patient presents with     Consult For     pterygium evaluation of the right eye     HPI    Affected eye(s):  Right   Symptoms:     Redness   No tearing   No Dryness   No itching      Duration:  2 months   Frequency:  Intermittent       Do you have eye pain now?:  No      Comments:  Pt RE red no itching. Pt does use artificial tears. Vision about the same since last visit, pt unsure.    Type 2 Diabetic Lab Results       Component                Value               Date                       A1C                      9.2                 04/09/2018              Pt accompanied by .    Liliam Lopez@ [facility] 8:40 AM May 30, 2018

## 2018-05-30 NOTE — MR AVS SNAPSHOT
After Visit Summary   5/30/2018    Alfa Sung    MRN: [id number]           Patient Information     Date Of Birth          1960        Visit Information        Provider Department      5/30/2018 8:15 AM Oren Guerra MD; [facility]        Today's Diagnoses     Pterygium, right    -  1    Pseudophakia of both eyes        Myopia of both eyes with astigmatism           Follow-ups after your visit        Your next 10 appointments already scheduled     Jul 09, 2018  7:00 AM CDT   Office Visit with Lenard Reid MD   [facility] ([facility])    [address] [place] [id number] [phone]           Bring a current list of meds and any records pertaining to this visit. For Physicals, please bring immunization records and any forms needing to be filled out. Please arrive 10 minutes early to complete paperwork.            Jul 24, 2018   Procedure with Oren Guerra MD   [facility] ([facility])    [address] [place] [address]  [address]th [place] [id number] [phone]           Located in the Clinics and Surgery Center at [address] [place] [address].   parking is very convenient and highly recommended.  is a $6 flat rate fee.  Both  and self parkers should enter the main arrival plaza from [facility]; parking attendants will direct you based on your parking preference.            Jul 25, 2018  8:15 AM CDT   Post-Op with Oren Guerra MD   Eye Clinic ([facility])    [address] [place] [address]   [phone]            Aug 01, 2018  8:15 AM CDT   Post-Op with Oren Guerra MD   Eye Clinic ([facility])    [address] [address]   [phone]            Aug 22,  2018  8:15 AM CDT   Post-Op with Oren Guerra MD   Eye Clinic ([facility])    [address]  [address] Clin [address]a  [facility] [address]   [phone]              Who to contact     Please call your clinic at [phone] to:    Ask questions about your health    Make or cancel appointments    Discuss your medicines    Learn about your test results    Speak to your doctor            Additional Information About Your Visit        Care EveryWhere ID     This is your Care EveryWhere ID. This could be used by other organizations to access your [place] medical records  [id number]         Blood Pressure from Last 3 Encounters:   04/09/18 139/81    Weight from Last 3 Encounters:   04/09/18 58.1 kg (128 lb)              We Performed the Following     Lina-Operative Worksheet (Ant Seg)        Primary Care Provider Office Phone # Fax #    Lenard Reid [phone]       [address]  [facility] [address]        Equal Access to Services     [facility]: Hadii aad ku hadasho Soomaali, waaxda luqadaha, qaybta kaalmada adeegyada, waxay idiin hayyesican brando kharash khoa . So [facility] [phone].    ATENCIÓN: Si habla español, tiene a new disposición servicios gratuitos de asistencia lingüística. [facility] [phone].    We comply with applicable federal civil rights laws and [place] laws. We do not discriminate on the basis of race, color, national origin, age, disability, sex, sexual orientation, or gender identity.            Thank you!     Thank you for choosing EYE CLINIC  for your care. Our goal is always to provide you with excellent care. Hearing back from our patients is one way we can continue to improve our services. Please take a few minutes to complete the written survey that you may receive in the mail after your visit with us. Thank you!             Your Updated Medication List - Protect others around you: Learn how to safely use,  store and throw away your medicines at www.disposemymeds.org.          This list is accurate as of 5/30/18 10:13 AM.  Always use your most recent med list.                   Brand Name Dispense Instructions for use Diagnosis    ASPIRIN PO      Take 81 mg by mouth daily (with dinner)        FIFTY50 GLUCOSE METER 2.0 w/Device Kit      Dispense meter, test strips, lancets covered by pt ins. E11.65 NIDDM type II, uncontrolled - Test 3 times/day, Reason: High A1C        glyBURIDE-metFORMIN 5-500 MG per tablet    GLUCOVANCE    180 tablet    Take 1 tablet by mouth 2 times daily (with meals)    Type 2 diabetes mellitus without complication, without long-term current use of insulin (H)       lisinopril 10 MG tablet    PRINIVIL/ZESTRIL    90 tablet    TK 1 T PO ONCE D    Hypertension, unspecified type       ONETOUCH ULTRA test strip   Generic drug:  blood glucose monitoring      Dispense item covered by pt ins. E11.9 NIDDM type II - Test 2 times/day. Reason: High A1C        simvastatin 10 MG tablet    ZOCOR     Take 10 mg by mouth    Mixed hyperlipidemia

## 2018-10-10 ENCOUNTER — OFFICE VISIT (OUTPATIENT)
Dept: FAMILY MEDICINE | Facility: CLINIC | Age: 58
End: 2018-10-10

## 2018-10-10 VITALS
DIASTOLIC BLOOD PRESSURE: 70 MMHG | WEIGHT: 132.8 LBS | BODY MASS INDEX: 25.94 KG/M2 | OXYGEN SATURATION: 98 % | SYSTOLIC BLOOD PRESSURE: 139 MMHG | HEART RATE: 85 BPM | TEMPERATURE: 98.2 F

## 2018-10-10 DIAGNOSIS — E78.2 MIXED HYPERLIPIDEMIA: ICD-10-CM

## 2018-10-10 DIAGNOSIS — I10 HYPERTENSION, UNSPECIFIED TYPE: ICD-10-CM

## 2018-10-10 LAB
ALBUMIN SERPL-MCNC: 3.8 G/DL (ref 3.4–5)
ALP SERPL-CCNC: 63 U/L (ref 40–150)
ALT SERPL W P-5'-P-CCNC: 30 U/L (ref 0–50)
ANION GAP SERPL CALCULATED.3IONS-SCNC: 5 MMOL/L (ref 3–14)
AST SERPL W P-5'-P-CCNC: 23 U/L (ref 0–45)
BILIRUB SERPL-MCNC: 0.4 MG/DL (ref 0.2–1.3)
BUN SERPL-MCNC: 16 MG/DL (ref 7–30)
CALCIUM SERPL-MCNC: 9.4 MG/DL (ref 8.5–10.1)
CHLORIDE SERPL-SCNC: 103 MMOL/L (ref 94–109)
CHOLEST SERPL-MCNC: 219 MG/DL
CO2 SERPL-SCNC: 33 MMOL/L (ref 20–32)
CREAT SERPL-MCNC: 0.63 MG/DL (ref 0.52–1.04)
GFR SERPL CREATININE-BSD FRML MDRD: >90 ML/MIN/1.7M2
GLUCOSE BLD-MCNC: 133 MG/DL (ref 70–99)
GLUCOSE SERPL-MCNC: 145 MG/DL (ref 70–99)
HBA1C MFR BLD: 10.6 % (ref 0–5.6)
HDLC SERPL-MCNC: 49 MG/DL
LDLC SERPL CALC-MCNC: 145 MG/DL
NONHDLC SERPL-MCNC: 170 MG/DL
POTASSIUM SERPL-SCNC: 4.5 MMOL/L (ref 3.4–5.3)
PROT SERPL-MCNC: 7.5 G/DL (ref 6.8–8.8)
SODIUM SERPL-SCNC: 141 MMOL/L (ref 133–144)
TRIGL SERPL-MCNC: 125 MG/DL

## 2018-10-10 PROCEDURE — 83036 HEMOGLOBIN GLYCOSYLATED A1C: CPT | Performed by: INTERNAL MEDICINE

## 2018-10-10 PROCEDURE — 80053 COMPREHEN METABOLIC PANEL: CPT | Performed by: INTERNAL MEDICINE

## 2018-10-10 PROCEDURE — 80061 LIPID PANEL: CPT | Performed by: INTERNAL MEDICINE

## 2018-10-10 PROCEDURE — 82947 ASSAY GLUCOSE BLOOD QUANT: CPT | Performed by: INTERNAL MEDICINE

## 2018-10-10 PROCEDURE — 99214 OFFICE O/P EST MOD 30 MIN: CPT | Performed by: INTERNAL MEDICINE

## 2018-10-10 PROCEDURE — 36415 COLL VENOUS BLD VENIPUNCTURE: CPT | Performed by: INTERNAL MEDICINE

## 2018-10-10 RX ORDER — SIMVASTATIN 10 MG
10 TABLET ORAL AT BEDTIME
Qty: 90 TABLET | Refills: 3 | Status: SHIPPED | OUTPATIENT
Start: 2018-10-10 | End: 2019-06-04

## 2018-10-10 RX ORDER — GLYBURIDE-METFORMIN HYDROCHLORIDE 5; 500 MG/1; MG/1
2 TABLET ORAL 2 TIMES DAILY WITH MEALS
Qty: 360 TABLET | Refills: 1 | Status: SHIPPED | OUTPATIENT
Start: 2018-10-10 | End: 2019-06-04

## 2018-10-10 RX ORDER — LISINOPRIL 10 MG/1
TABLET ORAL
Qty: 90 TABLET | Refills: 1 | Status: SHIPPED | OUTPATIENT
Start: 2018-10-10 | End: 2019-06-04

## 2018-10-10 NOTE — PROGRESS NOTES
SUBJECTIVE:   Alfa Sung is a 57 year old female who presents to clinic today for the following health issues:    Diabetes Follow-up  Patient is checking blood sugars: once daily.  Results are as follows:         am - 146 today morning    Diabetic concerns: None     Symptoms of hypoglycemia (low blood sugar): none     Paresthesias (numbness or burning in feet) or sores: No     Date of last diabetic eye exam: UTD    BP Readings from Last 2 Encounters:   04/09/18 139/81     Hemoglobin A1C (%)   Date Value   04/09/2018 9.2 (H)     LDL Cholesterol Calculated (mg/dL)   Date Value   04/09/2018 132 (H)   Diabetes Management Resources    Amount of exercise or physical activity: 6-7 days/week    Problems taking medications regularly: No    Medication side effects: none    Diet: watching what she is eating      Hyperlipidemia Follow-Up      Rate your low fat/cholesterol diet?: not monitoring fat    Taking statin?  Yes, no muscle aches from statin    Other lipid medications/supplements?:  none    Hypertension Follow-up      Outpatient blood pressures are not being checked.    Low Salt Diet: not monitoring salt      Problem list and histories reviewed & adjusted, as indicated.  Additional history: as documented    Patient Active Problem List   Diagnosis     Diabetes mellitus, type II (H)     HTN (hypertension)     Mixed hyperlipidemia     Primary osteoarthritis of left knee     Pseudophakia     Pterygium eye, right     Past Surgical History:   Procedure Laterality Date     CATARACT IOL, RT/LT Bilateral     2013       Social History   Substance Use Topics     Smoking status: Never Smoker     Smokeless tobacco: Never Used     Alcohol use No     Family History   Problem Relation Age of Onset     Glaucoma No family hx of      Macular Degeneration No family hx of          No Known Allergies  Recent Labs   Lab Test  10/10/18   1115  04/09/18   0741   A1C  10.6*  9.2*   LDL  145*  132*   HDL  49*  68   TRIG  125  59   ALT  30  22    CR  0.63  0.53   GFRESTIMATED  >90  >90   GFRESTBLACK  >90  >90   POTASSIUM  4.5  4.3   TSH   --   0.40      BP Readings from Last 3 Encounters:   10/10/18 139/70   04/09/18 139/81    Wt Readings from Last 3 Encounters:   10/10/18 132 lb 12.8 oz (60.2 kg)   04/09/18 128 lb (58.1 kg)                ROS:  CONSTITUTIONAL: NEGATIVE for fever, chills, change in weight  INTEGUMENTARY/SKIN: NEGATIVE for worrisome rashes, moles or lesions  EYES: NEGATIVE for vision changes or irritation  ENT/MOUTH: NEGATIVE for ear, mouth and throat problems  RESP: NEGATIVE for significant cough or SOB  CV: NEGATIVE for chest pain, palpitations or peripheral edema  GI: NEGATIVE for nausea, abdominal pain, heartburn, or change in bowel habits  : NEGATIVE for frequency, dysuria, or hematuria  MUSCULOSKELETAL: NEGATIVE for significant arthralgias or myalgia  NEURO: NEGATIVE for weakness, dizziness or paresthesias  ENDOCRINE: NEGATIVE for temperature intolerance, skin/hair changes  HEME: NEGATIVE for bleeding problems  PSYCHIATRIC: NEGATIVE for changes in mood or affect    OBJECTIVE:     /70  Pulse 85  Temp 98.2  F (36.8  C) (Oral)  Wt 132 lb 12.8 oz (60.2 kg)  SpO2 98%  BMI 25.94 kg/m2  Body mass index is 25.94 kg/(m^2).  GENERAL: healthy, alert and no distress  EYES: Eyes grossly normal to inspection, PERRL and conjunctivae and sclerae normal  HENT: ear canals and TM's normal, nose and mouth without ulcers or lesions  NECK: no adenopathy, no asymmetry, masses, or scars and thyroid normal to palpation  RESP: lungs clear to auscultation - no rales, rhonchi or wheezes  CV: regular rate and rhythm, normal S1 S2, no S3 or S4, no murmur, click or rub, no peripheral edema and peripheral pulses strong  ABDOMEN: soft, nontender, no hepatosplenomegaly, no masses and bowel sounds normal  MS: no gross musculoskeletal defects noted, no edema  SKIN: no suspicious lesions or rashes  NEURO: Normal strength and tone, mentation intact and speech  normal  PSYCH: mentation appears normal, affect normal/bright    Diagnostic Test Results:  Results for orders placed or performed in visit on 10/10/18   Hemoglobin A1c   Result Value Ref Range    Hemoglobin A1C 10.6 (H) 0 - 5.6 %   Lipid Profile (Chol, Trig, HDL, LDL calc)   Result Value Ref Range    Cholesterol 219 (H) <200 mg/dL    Triglycerides 125 <150 mg/dL    HDL Cholesterol 49 (L) >49 mg/dL    LDL Cholesterol Calculated 145 (H) <100 mg/dL    Non HDL Cholesterol 170 (H) <130 mg/dL   Glucose, whole blood   Result Value Ref Range    Glucose Whole Blood 133 (H) 70 - 99 mg/dL   Comprehensive metabolic panel   Result Value Ref Range    Sodium 141 133 - 144 mmol/L    Potassium 4.5 3.4 - 5.3 mmol/L    Chloride 103 94 - 109 mmol/L    Carbon Dioxide 33 (H) 20 - 32 mmol/L    Anion Gap 5 3 - 14 mmol/L    Glucose 145 (H) 70 - 99 mg/dL    Urea Nitrogen 16 7 - 30 mg/dL    Creatinine 0.63 0.52 - 1.04 mg/dL    GFR Estimate >90 >60 mL/min/1.7m2    GFR Estimate If Black >90 >60 mL/min/1.7m2    Calcium 9.4 8.5 - 10.1 mg/dL    Bilirubin Total 0.4 0.2 - 1.3 mg/dL    Albumin 3.8 3.4 - 5.0 g/dL    Protein Total 7.5 6.8 - 8.8 g/dL    Alkaline Phosphatase 63 40 - 150 U/L    ALT 30 0 - 50 U/L    AST 23 0 - 45 U/L       ASSESSMENT/PLAN:     (E11.65) Uncontrolled type 2 diabetes mellitus without complication (H)  (primary encounter diagnosis)  Comment: A1c is worse. Increase dose of Glyburide/Metformin to 10/1000 mg BID.  Plan: Hemoglobin A1c, Lipid Profile (Chol, Trig, HDL,        LDL calc), Glucose, whole blood, Comprehensive         metabolic panel, glyBURIDE-metFORMIN         (GLUCOVANCE) 5-500 MG per tablet            (E78.2) Mixed hyperlipidemia  Comment: LDL is worse. Still continue Simvastatin. If next LDL remains uncontrolled, then increase dose accordingly.  Plan: Lipid Profile (Chol, Trig, HDL, LDL calc),         simvastatin (ZOCOR) 10 MG tablet            (I10) Hypertension, unspecified type  Comment: Blood pressure is within  goal range.  Plan: Comprehensive metabolic panel, lisinopril         (PRINIVIL/ZESTRIL) 10 MG tablet          FUTURE LABS:       - Schedule fasting labs in 6 months  FUTURE APPOINTMENTS:       - Follow-up visit in 6 months.    Lenard Reid MD  [facility]

## 2018-10-10 NOTE — MR AVS SNAPSHOT
After Visit Summary   10/10/2018    Alfa Sung    MRN: [id number]           Patient Information     Date Of Birth          1960        Visit Information        Provider Department      10/10/2018 11:15 AM Lenard Reid MD; [employer] [facility]        Today's Diagnoses     Uncontrolled type 2 diabetes mellitus without complication (H)    -  1    Mixed hyperlipidemia        Hypertension, unspecified type           Follow-ups after your visit        Follow-up notes from your care team     Return in about 6 months (around 4/10/2019).      Who to contact     If you have questions or need follow up information about today's clinic visit or your schedule please contact [facility] directly at [phone].  Normal or non-critical lab and imaging results will be communicated to you by MyChart, letter or phone within 4 business days after the clinic has received the results. If you do not hear from us within 7 days, please contact the clinic through MyChart or phone. If you have a critical or abnormal lab result, we will notify you by phone as soon as possible.  Submit refill requests through [employer] or call your pharmacy and they will forward the refill request to us. Please allow 3 business days for your refill to be completed.          Additional Information About Your Visit        Care EveryWhere ID     This is your Care EveryWhere ID. This could be used by other organizations to access your [place] medical records  [id number]        Your Vitals Were     Pulse Temperature Pulse Oximetry BMI (Body Mass Index)          85 98.2  F (36.8  C) (Oral) 98% 25.94 kg/m2         Blood Pressure from Last 3 Encounters:   10/10/18 139/70   04/09/18 139/81    Weight from Last 3 Encounters:   10/10/18 132 lb 12.8 oz (60.2 kg)   04/09/18 128 lb (58.1 kg)              We Performed the Following     Comprehensive metabolic panel     Glucose, whole  blood     Hemoglobin A1c     Lipid Profile (Chol, Trig, HDL, LDL calc)          Today's Medication Changes          These changes are accurate as of 10/10/18 11:44 AM.  If you have any questions, ask your nurse or doctor.               These medicines have changed or have updated prescriptions.        Dose/Directions    glyBURIDE-metFORMIN 5-500 MG per tablet   Commonly known as:  GLUCOVANCE   This may have changed:  how much to take   Used for:  Uncontrolled type 2 diabetes mellitus without complication (H)   Changed by:  Lenard Reid MD        Dose:  2 tablet   Take 2 tablets by mouth 2 times daily (with meals)   Quantity:  360 tablet   Refills:  1       simvastatin 10 MG tablet   Commonly known as:  ZOCOR   This may have changed:  when to take this   Used for:  Mixed hyperlipidemia   Changed by:  Lenard Reid MD        Dose:  10 mg   Take 1 tablet (10 mg) by mouth At Bedtime   Quantity:  90 tablet   Refills:  3            Where to get your medicines      These medications were sent to [facility] [address] [place] [address] [id number]  [id number], [place] [address]     Phone:  [phone]     glyBURIDE-metFORMIN 5-500 MG per tablet    lisinopril 10 MG tablet    simvastatin 10 MG tablet                Primary Care Provider Office Phone # Fax #    Lenard Reid [phone]       [address]  [facility] [address]        Equal Access to Services     [facility] Center[facility]AK[facility] AH: Hadii thompson ku hadasho Soomaali, waaxda luqadaha, qaybta kaalmada adeegyada, waldo hernandez. So [facility] [phone].    ATENCIÓN: Si habla español, tiene a new disposición servicios gratuitos de asistencia lingüística. Rony al [phone].    We comply with applicable federal civil rights laws and [place] laws. We do not discriminate on the basis of race, color, national origin, age, disability, sex, sexual orientation, or gender identity.            Thank you!      Thank you for choosing [facility]  for your care. Our goal is always to provide you with excellent care. Hearing back from our patients is one way we can continue to improve our services. Please take a few minutes to complete the written survey that you may receive in the mail after your visit with us. Thank you!             Your Updated Medication List - Protect others around you: Learn how to safely use, store and throw away your medicines at www.disposemymeds.org.          This list is accurate as of 10/10/18 11:44 AM.  Always use your most recent med list.                   Brand Name Dispense Instructions for use Diagnosis    ASPIRIN PO      Take 81 mg by mouth daily (with dinner)        FIFTY50 GLUCOSE METER 2.0 w/Device Kit      Dispense meter, test strips, lancets covered by pt ins. E11.65 NIDDM type II, uncontrolled - Test 3 times/day, Reason: High A1C        glyBURIDE-metFORMIN 5-500 MG per tablet    GLUCOVANCE    360 tablet    Take 2 tablets by mouth 2 times daily (with meals)    Uncontrolled type 2 diabetes mellitus without complication (H)       lisinopril 10 MG tablet    PRINIVIL/ZESTRIL    90 tablet    TK 1 T PO ONCE D    Hypertension, unspecified type       ONETOUCH ULTRA test strip   Generic drug:  blood glucose monitoring      Dispense item covered by pt ins. E11.9 NIDDM type II - Test 2 times/day. Reason: High A1C        simvastatin 10 MG tablet    ZOCOR    90 tablet    Take 1 tablet (10 mg) by mouth At Bedtime    Mixed hyperlipidemia

## 2019-06-04 ENCOUNTER — OFFICE VISIT (OUTPATIENT)
Dept: FAMILY MEDICINE | Facility: CLINIC | Age: 59
End: 2019-06-04
Payer: COMMERCIAL

## 2019-06-04 VITALS
HEART RATE: 92 BPM | HEIGHT: 60 IN | BODY MASS INDEX: 25.32 KG/M2 | RESPIRATION RATE: 12 BRPM | OXYGEN SATURATION: 100 % | WEIGHT: 129 LBS | SYSTOLIC BLOOD PRESSURE: 134 MMHG | DIASTOLIC BLOOD PRESSURE: 72 MMHG | TEMPERATURE: 97.6 F

## 2019-06-04 DIAGNOSIS — E11.65 TYPE 2 DIABETES MELLITUS WITH HYPERGLYCEMIA, WITHOUT LONG-TERM CURRENT USE OF INSULIN (H): Primary | ICD-10-CM

## 2019-06-04 DIAGNOSIS — Z12.11 SPECIAL SCREENING FOR MALIGNANT NEOPLASMS, COLON: ICD-10-CM

## 2019-06-04 DIAGNOSIS — I10 ESSENTIAL HYPERTENSION WITH GOAL BLOOD PRESSURE LESS THAN 140/90: ICD-10-CM

## 2019-06-04 DIAGNOSIS — E78.5 HYPERLIPIDEMIA LDL GOAL <100: ICD-10-CM

## 2019-06-04 LAB
ALBUMIN SERPL-MCNC: 4.3 G/DL (ref 3.4–5)
ALP SERPL-CCNC: 72 U/L (ref 40–150)
ALT SERPL W P-5'-P-CCNC: 28 U/L (ref 0–50)
ANION GAP SERPL CALCULATED.3IONS-SCNC: 8 MMOL/L (ref 3–14)
AST SERPL W P-5'-P-CCNC: 14 U/L (ref 0–45)
BILIRUB SERPL-MCNC: 0.3 MG/DL (ref 0.2–1.3)
BUN SERPL-MCNC: 17 MG/DL (ref 7–30)
CALCIUM SERPL-MCNC: 9.4 MG/DL (ref 8.5–10.1)
CHLORIDE SERPL-SCNC: 101 MMOL/L (ref 94–109)
CO2 SERPL-SCNC: 30 MMOL/L (ref 20–32)
CREAT SERPL-MCNC: 0.62 MG/DL (ref 0.52–1.04)
CREAT UR-MCNC: 55 MG/DL
GFR SERPL CREATININE-BSD FRML MDRD: >90 ML/MIN/{1.73_M2}
GLUCOSE SERPL-MCNC: 74 MG/DL (ref 70–99)
HBA1C MFR BLD: 9.3 % (ref 0–5.6)
LDLC SERPL DIRECT ASSAY-MCNC: 125 MG/DL
MICROALBUMIN UR-MCNC: 11 MG/L
MICROALBUMIN/CREAT UR: 19.89 MG/G CR (ref 0–25)
POTASSIUM SERPL-SCNC: 4 MMOL/L (ref 3.4–5.3)
PROT SERPL-MCNC: 7.8 G/DL (ref 6.8–8.8)
SODIUM SERPL-SCNC: 139 MMOL/L (ref 133–144)

## 2019-06-04 PROCEDURE — 83721 ASSAY OF BLOOD LIPOPROTEIN: CPT | Performed by: FAMILY MEDICINE

## 2019-06-04 PROCEDURE — 82043 UR ALBUMIN QUANTITATIVE: CPT | Performed by: FAMILY MEDICINE

## 2019-06-04 PROCEDURE — 99214 OFFICE O/P EST MOD 30 MIN: CPT | Performed by: FAMILY MEDICINE

## 2019-06-04 PROCEDURE — 80053 COMPREHEN METABOLIC PANEL: CPT | Performed by: FAMILY MEDICINE

## 2019-06-04 PROCEDURE — 83036 HEMOGLOBIN GLYCOSYLATED A1C: CPT | Performed by: FAMILY MEDICINE

## 2019-06-04 PROCEDURE — 36415 COLL VENOUS BLD VENIPUNCTURE: CPT | Performed by: FAMILY MEDICINE

## 2019-06-04 RX ORDER — LISINOPRIL 10 MG/1
10 TABLET ORAL DAILY
Qty: 90 TABLET | Refills: 1 | Status: SHIPPED | OUTPATIENT
Start: 2019-06-04 | End: 2020-04-16

## 2019-06-04 RX ORDER — SIMVASTATIN 10 MG
10 TABLET ORAL AT BEDTIME
Qty: 90 TABLET | Refills: 3 | Status: SHIPPED | OUTPATIENT
Start: 2019-06-04 | End: 2019-06-05

## 2019-06-04 RX ORDER — GLYBURIDE-METFORMIN HYDROCHLORIDE 5; 500 MG/1; MG/1
2 TABLET ORAL 2 TIMES DAILY WITH MEALS
Qty: 360 TABLET | Refills: 1 | Status: SHIPPED | OUTPATIENT
Start: 2019-06-04 | End: 2019-12-11

## 2019-06-04 ASSESSMENT — MIFFLIN-ST. JEOR: SCORE: 1076.64

## 2019-06-04 ASSESSMENT — PAIN SCALES - GENERAL: PAINLEVEL: NO PAIN (0)

## 2019-06-04 NOTE — PATIENT INSTRUCTIONS
At [facility], we strive to deliver an exceptional experience to you, every time we see you.  If you receive a survey in the mail, please send us back your thoughts. We really do value your feedback.    Based on your medical history, these are the current health maintenance/preventive care services that you are due for (some may have been done at this visit.)  Health Maintenance Due   Topic Date Due     PREVENTIVE CARE VISIT  1960     DIABETIC FOOT EXAM  1960     ADVANCED DIRECTIVE PLANNING  1960     MAMMO SCREENING  1960     PAP  1960     HIV SCREENING  11/24/1973     ZOSTER IMMUNIZATION (1 of 2) 11/24/2008     PHQ-2  01/01/2019     MICROALBUMIN  04/09/2019     A1C  04/10/2019     FIT  04/16/2019     EYE EXAM  05/16/2019         Suggested websites for health information:  Www.GoingOn.[employer]iver : Up to date and easily searchable information on multiple topics.  Www.medlineplus.gov : medication info, interactive tutorials, watch real surgeries online  Www.familydoctor.org : good info from the Academy of Family Physicians  Www.cdc.gov : public health info, travel advisories, epidemics (H1N1)  Www.aap.org : children's health info, normal development, vaccinations  Www.health.state[place] : [place] dept of health, public health issues in [place], N1N1    Your care team:                            Family Medicine Internal Medicine   MD Lenard Mera MD Shantel Branch-Fleming, MD Katya Georgiev PA-C Nam Ho, MD Pediatrics   SERGEY Tracey, ABDIRAHMAN Potts APRN MD Aline Hussein MD Deborah Mielke, MD Kim Thein, ISSAC CNP      Clinic hours: Monday - Thursday 7 am-7 pm; Fridays 7 am-5 pm.   Urgent care: Monday - Friday 11 am-9 pm; Saturday and Sunday 9 am-5 pm.  Pharmacy : Monday -Thursday 8 am-8 pm; Friday 8 am-6 pm; Saturday and Sunday 9 am-5 pm.     Clinic: [phone]   Pharmacy: [phone]

## 2019-06-04 NOTE — PROGRESS NOTES
Subjective     Alfa Sung is a 58 year old female who presents to clinic today for the following health issues:    HPI   Diabetes Follow-up      How often are you checking your blood sugar? One time daily    What time of day are you checking your blood sugars (select all that apply)?  Before meals    Have you had any blood sugars above 200?  Yes sometimes    Have you had any blood sugars below 70?  No    What symptoms do you notice when your blood sugar is low?  None    What concerns do you have today about your diabetes? None     Do you have any of these symptoms? (Select all that apply)  No numbness or tingling in feet.  No redness, sores or blisters on feet.  No complaints of excessive thirst.  No reports of blurry vision.  No significant changes to weight.     Have you had a diabetic eye exam in the last 12 months? Yes- Date of last eye exam: 3 months ago in [facility]    Diabetes Management Resources    Hyperlipidemia Follow-Up      Are you having any of the following symptoms? (Select all that apply)  No complaints of shortness of breath, chest pain or pressure.  No increased sweating or nausea with activity.  No left-sided neck or arm pain.  No complaints of pain in calves when walking 1-2 blocks.    Are you regularly taking any medication or supplement to lower your cholesterol?   Yes- simvastatin    Are you having muscle aches or other side effects that you think could be caused by your cholesterol lowering medication?  No    Hypertension Follow-up      Do you check your blood pressure regularly outside of the clinic? No     Are you following a low salt diet? No    Are your blood pressures ever more than 140 on the top number (systolic) OR more   than 90 on the bottom number (diastolic), for example 140/90? No    BP Readings from Last 2 Encounters:   06/04/19 157/77   10/10/18 139/70     Hemoglobin A1C (%)   Date Value   06/04/2019 9.3 (H)   10/10/2018 10.6 (H)     LDL Cholesterol Calculated (mg/dL)   Date  Value   10/10/2018 145 (H)   04/09/2018 132 (H)       Amount of exercise or physical activity: 6-7 days/week for an average of 15-30 minutes    Problems taking medications regularly: No    Medication side effects: none    Diet: regular (no restrictions)        Patient Active Problem List   Diagnosis     Diabetes mellitus, type II (H)     Essential hypertension with goal blood pressure less than 140/90     Hyperlipidemia LDL goal <100     Primary osteoarthritis of left knee     Pseudophakia     Pterygium eye, right     Past Surgical History:   Procedure Laterality Date     CATARACT IOL, RT/LT Bilateral     2013       Social History     Tobacco Use     Smoking status: Never Smoker     Smokeless tobacco: Never Used   Substance Use Topics     Alcohol use: No     Family History   Problem Relation Age of Onset     Glaucoma No family hx of      Macular Degeneration No family hx of            Reviewed and updated as needed this visit by Provider         Review of Systems   ROS COMP: Constitutional, HEENT, cardiovascular, pulmonary, GI, , musculoskeletal, neuro, skin, endocrine and psych systems are negative, except as otherwise noted.      Objective    /72   Pulse 92   Temp 97.6  F (36.4  C) (Oral)   Resp 12   Ht 1.524 m (5')   Wt 58.5 kg (129 lb)   SpO2 100%   BMI 25.19 kg/m    Body mass index is 25.19 kg/m .  Physical Exam   GENERAL: healthy, alert and no distress  NECK: no adenopathy, no asymmetry, masses, or scars and thyroid normal to palpation  RESP: lungs clear to auscultation - no rales, rhonchi or wheezes  CV: regular rate and rhythm, normal S1 S2, no S3 or S4, no murmur, click or rub, no peripheral edema and peripheral pulses strong  ABDOMEN: soft, nontender, no hepatosplenomegaly, no masses and bowel sounds normal  MS: no gross musculoskeletal defects noted, no edema  Diabetic foot exam: normal DP and PT pulses, no trophic changes or ulcerative lesions and normal sensory exam    Diagnostic Test  Results:  Labs reviewed in Epic        Assessment & Plan     1. Type 2 diabetes mellitus with hyperglycemia, without long-term current use of insulin (H)  Not controlled. Added Jardiance 10 mg daily. RTC in 3 months for recheck.  - Albumin Random Urine Quantitative with Creat Ratio  - HEMOGLOBIN A1C  - Comprehensive metabolic panel  - glyBURIDE-metFORMIN (GLUCOVANCE) 5-500 MG tablet; Take 2 tablets by mouth 2 times daily (with meals) For diabetes.  Dispense: 360 tablet; Refill: 1  - empagliflozin (JARDIANCE) 10 MG TABS tablet; Take 1 tablet (10 mg) by mouth daily For diabetes.  Dispense: 90 tablet; Refill: 1    2. Hyperlipidemia LDL goal <100  Recheck while on simvastatin. Adjust dose if needed.  - LDL cholesterol direct  - simvastatin (ZOCOR) 10 MG tablet; Take 1 tablet (10 mg) by mouth At Bedtime For cholesterol.  Dispense: 90 tablet; Refill: 3    3. Essential hypertension with goal blood pressure less than 140/90  Controlled.  - lisinopril (PRINIVIL/ZESTRIL) 10 MG tablet; Take 1 tablet (10 mg) by mouth daily For blood pressure.  Dispense: 90 tablet; Refill: 1    4. Special screening for malignant neoplasms, colon    - MA SCREENING DIGITAL BILAT - Future  (s+30); Future  - Fecal colorectal cancer screen (FIT); Future     BMI:   Estimated body mass index is 25.19 kg/m  as calculated from the following:    Height as of this encounter: 1.524 m (5').    Weight as of this encounter: 58.5 kg (129 lb).           Work on weight loss  Regular exercise  See Patient Instructions    Return in about 3 months (around 9/4/2019) for Diabetes.    Emre Coelho MD, MD  [facility]

## 2019-06-05 ENCOUNTER — TELEPHONE (OUTPATIENT)
Dept: FAMILY MEDICINE | Facility: CLINIC | Age: 59
End: 2019-06-05

## 2019-06-05 DIAGNOSIS — E78.5 HYPERLIPIDEMIA LDL GOAL <100: Primary | ICD-10-CM

## 2019-06-05 DIAGNOSIS — E11.65 TYPE 2 DIABETES MELLITUS WITH HYPERGLYCEMIA, WITHOUT LONG-TERM CURRENT USE OF INSULIN (H): Primary | ICD-10-CM

## 2019-06-05 RX ORDER — ATORVASTATIN CALCIUM 40 MG/1
40 TABLET, FILM COATED ORAL DAILY
Qty: 90 TABLET | Refills: 3 | Status: SHIPPED | OUTPATIENT
Start: 2019-06-05 | End: 2020-04-16

## 2019-06-05 NOTE — TELEPHONE ENCOUNTER
Notes recorded by Emre Coelho MD on 6/5/2019 at 7:22 AM CDT  Patient needs [place] . Please notify patient that her cholesterol is not at goal. Please have patient discontinue current cholesterol medication, simvastatin, and start a new one, atorvastatin. We will recheck cholesterol and diabetes at next visit in 3 months.    Emre Coelho MD

## 2019-06-05 NOTE — TELEPHONE ENCOUNTER
Patient returned call - routed to rnjanay and parked    Best number to reach caller: Cell number on file:    Telephone Information:   Mobile [phone]       Is it ok to leave a detailed message: YES

## 2019-06-05 NOTE — TELEPHONE ENCOUNTER
This writer attempted to contact [facility] via  [phone] on 06/05/19      Reason for call abnormal results and left message.      If patient calls back:   Registered Nurse called. Follow Triage Call workflow    Jennyfer Alfaro RN

## 2019-06-05 NOTE — TELEPHONE ENCOUNTER
Patient updated on the below, verbalized understanding.  Patient said that Jardiance is too expensive and would like a substitution.    Routing to Dr. Coelho.  Please review and advise when able.  Pamela Ocampo RN

## 2019-06-06 RX ORDER — PIOGLITAZONEHYDROCHLORIDE 15 MG/1
15 TABLET ORAL DAILY
Qty: 90 TABLET | Refills: 1 | Status: SHIPPED | OUTPATIENT
Start: 2019-06-06 | End: 2019-12-11

## 2019-06-07 NOTE — TELEPHONE ENCOUNTER
This writer attempted to contact [facility] on 06/07/19    Reason for call Rx change and left detailed message.    When patient calls back, please contact 1st floor Dasha Martinez. routine priority.        Florencio Tatum

## 2019-07-30 ENCOUNTER — OFFICE VISIT (OUTPATIENT)
Dept: FAMILY MEDICINE | Facility: CLINIC | Age: 59
End: 2019-07-30
Payer: COMMERCIAL

## 2019-07-30 VITALS
DIASTOLIC BLOOD PRESSURE: 81 MMHG | OXYGEN SATURATION: 100 % | HEART RATE: 94 BPM | SYSTOLIC BLOOD PRESSURE: 159 MMHG | BODY MASS INDEX: 26.5 KG/M2 | TEMPERATURE: 98.2 F | WEIGHT: 135 LBS | RESPIRATION RATE: 16 BRPM | HEIGHT: 60 IN

## 2019-07-30 DIAGNOSIS — D17.30 LIPOMA OF SKIN AND SUBCUTANEOUS TISSUE: Primary | ICD-10-CM

## 2019-07-30 DIAGNOSIS — I10 ESSENTIAL HYPERTENSION WITH GOAL BLOOD PRESSURE LESS THAN 140/90: ICD-10-CM

## 2019-07-30 DIAGNOSIS — Z12.31 VISIT FOR SCREENING MAMMOGRAM: ICD-10-CM

## 2019-07-30 PROCEDURE — 99213 OFFICE O/P EST LOW 20 MIN: CPT | Performed by: NURSE PRACTITIONER

## 2019-07-30 ASSESSMENT — MIFFLIN-ST. JEOR: SCORE: 1103.86

## 2019-07-30 ASSESSMENT — PAIN SCALES - GENERAL: PAINLEVEL: NO PAIN (0)

## 2019-07-30 NOTE — PROGRESS NOTES
Subjective     Alfa Sung is a 58 year old female who presents to clinic today for the following health issues:    HPI   Bump on upper back right side by shoulder      Duration: noticed two weeks ago    Description (location/character/radiation): harden area on upper right back    Intensity:  mild    Accompanying signs and symptoms: none    History (similar episodes/previous evaluation): None    Precipitating or alleviating factors: None    Therapies tried and outcome: None         Patient Active Problem List   Diagnosis     Diabetes mellitus, type II (H)     Essential hypertension with goal blood pressure less than 140/90     Hyperlipidemia LDL goal <100     Primary osteoarthritis of left knee     Pseudophakia     Pterygium eye, right     Past Surgical History:   Procedure Laterality Date     CATARACT IOL, RT/LT Bilateral     2013       Social History     Tobacco Use     Smoking status: Never Smoker     Smokeless tobacco: Never Used   Substance Use Topics     Alcohol use: No     Family History   Problem Relation Age of Onset     Glaucoma No family hx of      Macular Degeneration No family hx of          Current Outpatient Medications   Medication Sig Dispense Refill     ASPIRIN PO Take 81 mg by mouth daily (with dinner)       atorvastatin (LIPITOR) 40 MG tablet Take 1 tablet (40 mg) by mouth daily For cholesterol. 90 tablet 3     blood glucose monitoring (ONETOUCH ULTRA) test strip Dispense item covered by pt ins. E11.9 NIDDM type II - Test 2 times/day. Reason: High A1C       Blood Glucose Monitoring Suppl (FIFTY50 GLUCOSE METER 2.0) W/DEVICE KIT Dispense meter, test strips, lancets covered by pt ins. E11.65 NIDDM type II, uncontrolled - Test 3 times/day, Reason: High A1C       glyBURIDE-metFORMIN (GLUCOVANCE) 5-500 MG tablet Take 2 tablets by mouth 2 times daily (with meals) For diabetes. 360 tablet 1     lisinopril (PRINIVIL/ZESTRIL) 10 MG tablet Take 1 tablet (10 mg) by mouth daily For blood pressure. 90 tablet  1     pioglitazone (ACTOS) 15 MG tablet Take 1 tablet (15 mg) by mouth daily For diabetes. 90 tablet 1     No Known Allergies      Reviewed and updated as needed this visit by Provider  Tobacco  Allergies  Meds  Problems  Med Hx  Surg Hx  Fam Hx         Review of Systems   ROS COMP: Constitutional, HEENT, cardiovascular, pulmonary, gi and gu systems are negative, except as otherwise noted.      Objective    BP (!) 159/81 (BP Location: Right arm, Patient Position: Sitting, Cuff Size: Adult Regular)   Pulse 94   Temp 98.2  F (36.8  C) (Oral)   Resp 16   Ht 1.524 m (5')   Wt 61.2 kg (135 lb)   SpO2 100%   BMI 26.37 kg/m    Body mass index is 26.37 kg/m .  Physical Exam   GENERAL: healthy, alert and no distress  RESP: lungs clear to auscultation - no rales, rhonchi or wheezes  CV: regular rate and rhythm, normal S1 S2, no S3 or S4, no murmur, click or rub, no peripheral edema and peripheral pulses strong  MS: no gross musculoskeletal defects noted, no edema  SKIN: skin colored mobile lump to right shoulder. Non-tender. Feels approx golf-ball sized  PSYCH: mentation appears normal, affect normal/bright    Diagnostic Test Results:  none         Assessment & Plan     1. Lipoma of skin and subcutaneous tissue  She would like it removed. Will refer to general surgery due to size.  - GENERAL SURG ADULT REFERRAL    2. Visit for screening mammogram  - MA SCREENING DIGITAL BILAT - Future  (s+30); Future    3. Essential hypertension with goal blood pressure less than 140/90  Asymptomatic. States worried about visit.       BMI:   Estimated body mass index is 26.37 kg/m  as calculated from the following:    Height as of this encounter: 1.524 m (5').    Weight as of this encounter: 61.2 kg (135 lb).           See Patient Instructions    Return in about 4 weeks (around 8/27/2019), or if symptoms worsen or fail to improve.       Return precautions discussed, including when to seek urgent/emergent care.    Patient  verbalizes understanding and agrees with plan of care. Patient stable for discharge.      ISSAC Rodriguez CNP  [facility]

## 2019-07-30 NOTE — PATIENT INSTRUCTIONS
At [facility], we strive to deliver an exceptional experience to you, every time we see you.  If you receive a survey in the mail, please send us back your thoughts. We really do value your feedback.    Based on your medical history, these are the current health maintenance/preventive care services that you are due for (some may have been done at this visit.)  Health Maintenance Due   Topic Date Due     PREVENTIVE CARE VISIT  1960     ADVANCE CARE PLANNING  1960     MAMMO SCREENING  1960     PAP  1960     HIV SCREENING  11/24/1973     ZOSTER IMMUNIZATION (1 of 2) 11/24/2008     PHQ-2  01/01/2019     FIT  04/16/2019     EYE EXAM  05/16/2019         Suggested websites for health information:  Www.YouMail.Visual Threat : Up to date and easily searchable information on multiple topics.  Www.DesRueda.com.gov : medication info, interactive tutorials, watch real surgeries online  Www.familydoctor.org : good info from the Academy of Family Physicians  Www.cdc.gov : public health info, travel advisories, epidemics (H1N1)  Www.aap.org : children's health info, normal development, vaccinations  Www.health.[facility].[place].us : [place] dept of health, public health issues in [place], N1N1    Your care team:                            Family Medicine Internal Medicine   MD Lenard Mera MD Shantel Branch-Fleming, MD Katya Georgiev PA-C Nam Ho, MD Pediatrics   SERGEY Tracey, MD Aline Kate CNP, MD Deborah Mielke, MD Kim Thein, APRN CNP      Clinic hours: Monday - Thursday 7 am-7 pm; Fridays 7 am-5 pm.   Urgent care: Monday - Friday 11 am-9 pm; Saturday and Sunday 9 am-5 pm.  Pharmacy : Monday -Thursday 8 am-8 pm; Friday 8 am-6 pm; Saturday and Sunday 9 am-5 pm.     Clinic: [phone]   Pharmacy: [phone]    Patient Education     Understanding a Lipoma    A lipoma is a benign lump under the skin that s made  of fat. It s not cancer. It feels soft like rubber when you press it, and in most cases it doesn t hurt. Some people have more than one. A lipoma grows slowly over time and doesn t cause many problems. Lipomas occur most often in adults from ages 40 to 60, and more often in men.  How to say it  Ly-POH-kiara   What causes a lipoma?  The cause is not yet known. Experts are still learning more. It may be partly caused by a problem in a gene. They can run in families. Familial multiple lipomatosis is when 2 or more family members have many lipomas.  Symptoms of a lipoma  The main symptom of a lipoma is a soft lump under the skin that doesn t hurt unless it is pressing on a nerve. It may be small, around 1/4 inch across. Or it may be larger, up to 4 inches across or more.  There are different kinds of lipomas. The most common kind occurs under the skin of the shoulders, chest, back, belly, or under the arms. In some cases, a lipoma can occur on the legs. In rare cases, one may occur deeper in the body or in a muscle.  Treatment for a lipoma  In most cases, a lipoma doesn t need treatment. Your healthcare provider may look at it during regular checkups to see if it changes.  But if the lipoma is painful or you want it removed for cosmetic reasons, it can be removed with surgery. The surgery is called excision. The lipoma will most likely not grow back after surgery. During surgery, the area around the lipoma is numbed. If you have a deep lipoma, you may need medicine called regional anesthesia to numb a larger area. Or you may need medicine called general anesthesia to put you to sleep during the procedure. Then the doctor makes a cut over the area of the lipoma. He or she removes the lump of fat. The cut is then closed with stitches.  Possible complications of a lipoma  A large lipoma inside the body can press on organs, nerves, or other tissues and cause problems. For example, it can cause problems with breathing or  digestion.  Living with a lipoma  Your healthcare provider may look at the lipoma during regular checkups to see if it changes or is causing problems.  When to call your healthcare provider  Call your healthcare provider right away if you have any of these:    Lipoma that grows quickly, causes pain, or feels hard    Growth of new lipomas   Date Last Reviewed: 5/1/2016 2000-2018 [employer]. [address] [place] [address]. All rights reserved. This information is not intended as a substitute for professional medical care. Always follow your healthcare professional's instructions.

## 2019-08-24 ENCOUNTER — ANCILLARY PROCEDURE (OUTPATIENT)
Dept: MAMMOGRAPHY | Facility: CLINIC | Age: 59
End: 2019-08-24
Attending: NURSE PRACTITIONER
Payer: COMMERCIAL

## 2019-08-24 DIAGNOSIS — Z12.31 VISIT FOR SCREENING MAMMOGRAM: ICD-10-CM

## 2019-08-24 PROCEDURE — 77067 SCR MAMMO BI INCL CAD: CPT | Mod: TC

## 2019-12-11 DIAGNOSIS — E11.65 TYPE 2 DIABETES MELLITUS WITH HYPERGLYCEMIA, WITHOUT LONG-TERM CURRENT USE OF INSULIN (H): ICD-10-CM

## 2019-12-12 RX ORDER — GLYBURIDE-METFORMIN HYDROCHLORIDE 5; 500 MG/1; MG/1
TABLET ORAL
Qty: 120 TABLET | Refills: 0 | Status: SHIPPED | OUTPATIENT
Start: 2019-12-12 | End: 2020-01-06

## 2019-12-12 RX ORDER — PIOGLITAZONEHYDROCHLORIDE 15 MG/1
TABLET ORAL
Qty: 30 TABLET | Refills: 0 | Status: SHIPPED | OUTPATIENT
Start: 2019-12-12 | End: 2020-01-06

## 2019-12-12 NOTE — TELEPHONE ENCOUNTER
Routing refill request to provider for review/approval because:  Labs out of range:  A1c- 9.3 on 06/04/2019  Labs not current:  a1c   BP failed [facility] refill protocol    Ivis Ren RN

## 2019-12-12 NOTE — TELEPHONE ENCOUNTER
Requested Prescriptions   Pending Prescriptions Disp Refills     glyBURIDE-metFORMIN (GLUCOVANCE) 5-500 MG tablet [Pharmacy Med Name: GLYBURIDE/METFORM 5/500MG TAB] 360 tablet 1     Sig: TAKE 2 TABLETS BY MOUTH TWICE DAILY WITH MEALS FOR  DIABETES         Last Written Prescription Date:  6/4/19  Last Fill Quantity: 360,  # refills: 1   Last Office Visit with [facility], [facility] or [facility] prescribing provider:  7/30/19   Future Office Visit:         Combination Oral Antihyperglycemic Agents Failed - 12/11/2019  5:40 PM        Failed - Blood pressure under 140/90 in past 12 months     BP Readings from Last 3 Encounters:   07/30/19 (!) 159/81   06/04/19 134/72   10/10/18 139/70                 Failed - Patient has documented A1c within the specified period of time.     If HgbA1C is 8 or greater, it needs to be on file within the past 3 months.  If less than 8, must be on file within the past 6 months.     Recent Labs   Lab Test 06/04/19  1613   A1C 9.3*             Passed - Patient has a documented LDL level within past 12 mos.     Recent Labs   Lab Test 06/04/19  1615   *             Passed - Patient has a documented Microalbumin level within past 12 mos.     Recent Labs   Lab Test 06/04/19  1627   MICROL 11   UMALCR 19.89             Passed - Patient's CR is NOT>1.4 OR Patient's EGFR is NOT<45 within past 12 mos.     Recent Labs   Lab Test 06/04/19  1613   GFRESTIMATED >90   GFRESTBLACK >90       Recent Labs   Lab Test 06/04/19  1613   CR 0.62             Passed - Patient does not have a diagnosis of CHF.        Passed - Medication is active on med list        Passed - Patient is 18 years old or older.        Passed - Patient is not pregnant        Passed - Patient has not had a positive pregnancy test within the past 12 mos.        Passed - Recent (6 mo) or future (30 days) visit within the authorizing provider's specialty     Patient had office visit in the last 6 months or has a visit in the next 30 days with  "authorizing provider or within the authorizing provider's specialty.  See \"Patient Info\" tab in inbasket, or \"Choose Columns\" in Meds & Orders section of the refill encounter.            pioglitazone (ACTOS) 15 MG tablet [Pharmacy Med Name: PIOGLITAZONE HCL 15MG   TAB] 90 tablet 1     Sig: TAKE 1 TABLET BY MOUTH ONCE DAILY FOR DIABETES         Last Written Prescription Date:  6/6/19  Last Fill Quantity: 90,  # refills: 1   Last Office Visit with [facility], [facility] or [facility] prescribing provider:  7/30/19   Future Office Visit:         Thiazolidinedione Agents (TZDs)  Failed - 12/11/2019  5:40 PM        Failed - Blood pressure less than 140/90 in past 6 months     BP Readings from Last 3 Encounters:   07/30/19 (!) 159/81   06/04/19 134/72   10/10/18 139/70                 Failed - Patient has documented A1c within the specified period of time.     If HgbA1C is 8 or greater, it needs to be on file within the past 3 months.  If less than 8, must be on file within the past 6 months.     Recent Labs   Lab Test 06/04/19  1613   A1C 9.3*             Passed - Patient has documented LDL within the past 12 mos.     Recent Labs   Lab Test 06/04/19  1615   *             Passed - Patient has a normal ALT within the past 12 mos.     Recent Labs   Lab Test 06/04/19  1613   ALT 28             Passed - Patient has a normal AST within the past 12 mos.      Recent Labs   Lab Test 06/04/19  1613   AST 14             Passed - Patient has had a Microalbumin in the past 12 mos.     Recent Labs   Lab Test 06/04/19  1627   MICROL 11   UMALCR 19.89             Passed - Diagnosis not CHF        Passed - Medication is active on med list        Passed - Patient is age 18 or older        Passed - Patient is not pregnant        Passed - Patient has a normal serum Creatinine in the past 12 months     Recent Labs   Lab Test 06/04/19  1613   CR 0.62             Passed - Patient has not had a positive pregnancy test within the past 12 mos.        " "Passed - Recent (6 mo) or future (30 days) visit within the authorizing provider's specialty     Patient had office visit in the last 6 months or has a visit in the next 30 days with authorizing provider or within the authorizing provider's specialty.  See \"Patient Info\" tab in inbasket, or \"Choose Columns\" in Meds & Orders section of the refill encounter.                  Valerio Faarax  [facility]  "

## 2020-01-02 DIAGNOSIS — E11.65 TYPE 2 DIABETES MELLITUS WITH HYPERGLYCEMIA, WITHOUT LONG-TERM CURRENT USE OF INSULIN (H): ICD-10-CM

## 2020-01-03 NOTE — TELEPHONE ENCOUNTER
Requested Prescriptions   Pending Prescriptions Disp Refills     pioglitazone (ACTOS) 15 MG tablet [Pharmacy Med Name: Pioglitazone HCl 15 MG Oral Tablet]  Last Written Prescription Date:  12/12/19  Last Fill Quantity: 30,  # refills: 0   Last Office Visit with G, D[facility] or [facility] prescribing provider:  7/30/19   Future Office Visit:       0     Sig: TAKE 1 TABLET BY MOUTH ONCE DAILY FOR DIABETES       Thiazolidinedione Agents (TZDs)  Failed - 1/2/2020  6:06 PM        Failed - Blood pressure less than 140/90 in past 6 months     BP Readings from Last 3 Encounters:   07/30/19 (!) 159/81   06/04/19 134/72   10/10/18 139/70                 Failed - Patient has documented A1c within the specified period of time.     If HgbA1C is 8 or greater, it needs to be on file within the past 3 months.  If less than 8, must be on file within the past 6 months.     Recent Labs   Lab Test 06/04/19  1613   A1C 9.3*             Passed - Patient has documented LDL within the past 12 mos.     Recent Labs   Lab Test 06/04/19  1615   *             Passed - Patient has a normal ALT within the past 12 mos.     Recent Labs   Lab Test 06/04/19  1613   ALT 28             Passed - Patient has a normal AST within the past 12 mos.      Recent Labs   Lab Test 06/04/19  1613   AST 14             Passed - Patient has had a Microalbumin in the past 12 mos.     Recent Labs   Lab Test 06/04/19  1627   MICROL 11   UMALCR 19.89             Passed - Diagnosis not CHF        Passed - Medication is active on med list        Passed - Patient is age 18 or older        Passed - Patient is not pregnant        Passed - Patient has a normal serum Creatinine in the past 12 months     Recent Labs   Lab Test 06/04/19  1613   CR 0.62             Passed - Patient has not had a positive pregnancy test within the past 12 mos.        Passed - Recent (6 mo) or future (30 days) visit within the authorizing provider's specialty     Patient had office visit in the  "last 6 months or has a visit in the next 30 days with authorizing provider or within the authorizing provider's specialty.  See \"Patient Info\" tab in inbasket, or \"Choose Columns\" in Meds & Orders section of the refill encounter.            glyBURIDE-metFORMIN (GLUCOVANCE) 5-500 MG tablet [Pharmacy Med Name: glyBURIDE-metFORMIN 5-500 MG Oral Tablet]  Last Written Prescription Date:  12/12/19  Last Fill Quantity: 120,  # refills: 0   Last Office Visit with [facility] or [facility] prescribing provider:  7/30/19   Future Office Visit:       0     Sig: TAKE 2 TABLETS BY MOUTH TWICE DAILY WITH MEALS . APPOINTMENT REQUIRED FOR FUTURE REFILLS       Combination Oral Antihyperglycemic Agents Failed - 1/2/2020  6:06 PM        Failed - Blood pressure under 140/90 in past 12 months     BP Readings from Last 3 Encounters:   07/30/19 (!) 159/81   06/04/19 134/72   10/10/18 139/70                 Failed - Patient has documented A1c within the specified period of time.     If HgbA1C is 8 or greater, it needs to be on file within the past 3 months.  If less than 8, must be on file within the past 6 months.     Recent Labs   Lab Test 06/04/19  1613   A1C 9.3*             Passed - Patient has a documented LDL level within past 12 mos.     Recent Labs   Lab Test 06/04/19  1615   *             Passed - Patient has a documented Microalbumin level within past 12 mos.     Recent Labs   Lab Test 06/04/19  1627   MICROL 11   UMALCR 19.89             Passed - Patient's CR is NOT>1.4 OR Patient's EGFR is NOT<45 within past 12 mos.     Recent Labs   Lab Test 06/04/19  1613   GFRESTIMATED >90   GFRESTBLACK >90       Recent Labs   Lab Test 06/04/19  1613   CR 0.62             Passed - Patient does not have a diagnosis of CHF.        Passed - Medication is active on med list        Passed - Patient is 18 years old or older.        Passed - Patient is not pregnant        Passed - Patient has not had a positive pregnancy test within the past 12 " "mos.        Passed - Recent (6 mo) or future (30 days) visit within the authorizing provider's specialty     Patient had office visit in the last 6 months or has a visit in the next 30 days with authorizing provider or within the authorizing provider's specialty.  See \"Patient Info\" tab in inbasket, or \"Choose Columns\" in Meds & Orders section of the refill encounter.              "

## 2020-01-06 RX ORDER — GLYBURIDE-METFORMIN HYDROCHLORIDE 5; 500 MG/1; MG/1
TABLET ORAL
Qty: 120 TABLET | Refills: 0 | Status: SHIPPED | OUTPATIENT
Start: 2020-01-06 | End: 2020-02-25

## 2020-01-06 RX ORDER — PIOGLITAZONEHYDROCHLORIDE 15 MG/1
TABLET ORAL
Qty: 30 TABLET | Refills: 0 | Status: SHIPPED | OUTPATIENT
Start: 2020-01-06 | End: 2020-02-25

## 2020-01-06 NOTE — TELEPHONE ENCOUNTER
Routing refill request to provider for review/approval because:  Labs not current:  A1c 9.3 on 06/04/2019  BP fails [facility] refill protocol    Ivis Ren RN

## 2020-02-22 DIAGNOSIS — E11.65 TYPE 2 DIABETES MELLITUS WITH HYPERGLYCEMIA, WITHOUT LONG-TERM CURRENT USE OF INSULIN (H): ICD-10-CM

## 2020-02-24 NOTE — TELEPHONE ENCOUNTER
"Requested Prescriptions   Pending Prescriptions Disp Refills     glyBURIDE-metFORMIN (GLUCOVANCE) 5-500 MG tablet [Pharmacy Med Name: glyBURIDE-metFORMIN 5-500 MG Oral Tablet]  Last Written Prescription Date:  01/06/2020  Last Fill Quantity: 120,  # refills: 0   Last [facility] Visit with [facility] or [facility] prescribing provider:  07/30/19HamidaHighland   Future Office Visit:     0     Sig: TAKE 2 TABLETS BY MOUTH TWICE DAILY WITH MEALS . APPOINTMENT REQUIRED FOR FUTURE REFILLS       Combination Oral Antihyperglycemic Agents Failed - 2/22/2020 10:59 AM        Failed - Blood pressure under 140/90 in past 12 months     BP Readings from Last 3 Encounters:   07/30/19 (!) 159/81   06/04/19 134/72   10/10/18 139/70                 Failed - Patient has documented A1c within the specified period of time.     If HgbA1C is 8 or greater, it needs to be on file within the past 3 months.  If less than 8, must be on file within the past 6 months.     Recent Labs   Lab Test 06/04/19  1613   A1C 9.3*             Failed - Recent (6 mo) or future (30 days) visit within the authorizing provider's specialty     Patient had office visit in the last 6 months or has a visit in the next 30 days with authorizing provider or within the authorizing provider's specialty.  See \"Patient Info\" tab in inbasket, or \"Choose Columns\" in Meds & Orders section of the refill encounter.            Passed - Patient has a documented LDL level within past 12 mos.     Recent Labs   Lab Test 06/04/19  1615   *             Passed - Patient has a documented Microalbumin level within past 12 mos.     Recent Labs   Lab Test 06/04/19  1627   MICROL 11   UMALCR 19.89             Passed - Patient's CR is NOT>1.4 OR Patient's EGFR is NOT<45 within past 12 mos.     Recent Labs   Lab Test 06/04/19  1613   GFRESTIMATED >90   GFRESTBLACK >90       Recent Labs   Lab Test 06/04/19  1613   CR 0.62             Passed - Patient does not have a diagnosis of CHF.        Passed - " "Medication is active on med list        Passed - Patient is 18 years old or older.        Passed - Patient is not pregnant        Passed - Patient has not had a positive pregnancy test within the past 12 mos.        pioglitazone (ACTOS) 15 MG tablet [Pharmacy Med Name: Pioglitazone HCl 15 MG Oral Tablet]  Last Written Prescription Date:  01/06/2020  Last Fill Quantity: 30,  # refills: 0   Last Office Visit with [facility], [facility] or [facility] prescribing provider:  07/3019-Hill   Future Office Visit:     0     Sig: TAKE 1 TABLET BY MOUTH ONCE DAILY FOR DIABETES       Thiazolidinedione Agents (TZDs)  Failed - 2/22/2020 10:59 AM        Failed - Blood pressure less than 140/90 in past 6 months     BP Readings from Last 3 Encounters:   07/30/19 (!) 159/81   06/04/19 134/72   10/10/18 139/70                 Failed - Patient has documented A1c within the specified period of time.     If HgbA1C is 8 or greater, it needs to be on file within the past 3 months.  If less than 8, must be on file within the past 6 months.     Recent Labs   Lab Test 06/04/19  1613   A1C 9.3*             Failed - Recent (6 mo) or future (30 days) visit within the authorizing provider's specialty     Patient had office visit in the last 6 months or has a visit in the next 30 days with authorizing provider or within the authorizing provider's specialty.  See \"Patient Info\" tab in inbasket, or \"Choose Columns\" in Meds & Orders section of the refill encounter.            Passed - Patient has documented LDL within the past 12 mos.     Recent Labs   Lab Test 06/04/19  1615   *             Passed - Patient has a normal ALT within the past 12 mos.     Recent Labs   Lab Test 06/04/19  1613   ALT 28             Passed - Patient has a normal AST within the past 12 mos.      Recent Labs   Lab Test 06/04/19  1613   AST 14             Passed - Patient has had a Microalbumin in the past 12 mos.     Recent Labs   Lab Test 06/04/19  1627   MICROL 11   UMALCR 19.89 "             Passed - Diagnosis not CHF        Passed - Medication is active on med list        Passed - Patient is age 18 or older        Passed - Patient is not pregnant        Passed - Patient has a normal serum Creatinine in the past 12 months     Recent Labs   Lab Test 06/04/19  1613   CR 0.62             Passed - Patient has not had a positive pregnancy test within the past 12 mos.

## 2020-02-25 RX ORDER — GLYBURIDE-METFORMIN HYDROCHLORIDE 5; 500 MG/1; MG/1
TABLET ORAL
Qty: 120 TABLET | Refills: 0 | Status: SHIPPED | OUTPATIENT
Start: 2020-02-25 | End: 2020-03-30

## 2020-02-25 RX ORDER — PIOGLITAZONEHYDROCHLORIDE 15 MG/1
TABLET ORAL
Qty: 30 TABLET | Refills: 0 | Status: SHIPPED | OUTPATIENT
Start: 2020-02-25 | End: 2020-03-30

## 2020-02-25 NOTE — TELEPHONE ENCOUNTER
Routing refill request to provider for review/approval because:  Za given x1 and patient did not follow up, please advise.    Jennyfer Alfaro RN, [facility] Triage

## 2020-03-28 DIAGNOSIS — E11.65 TYPE 2 DIABETES MELLITUS WITH HYPERGLYCEMIA, WITHOUT LONG-TERM CURRENT USE OF INSULIN (H): ICD-10-CM

## 2020-03-28 NOTE — LETTER
[address]  [address]  [phone]    April 3, 2020      Alfa Sung  [address] [address] [address]      Dear Alfa      We have refilled your medications for one month.   We will need to schedule a telephone visit, before any additional refills can be given.  Please call [phone] to schedule this appointment.      Thank you,    [facility]

## 2020-03-29 NOTE — TELEPHONE ENCOUNTER
"Requested Prescriptions   Pending Prescriptions Disp Refills     pioglitazone (ACTOS) 15 MG tablet [Pharmacy Med Name: Pioglitazone HCl 15 MG Oral Tablet] 30 tablet 0     Sig: TAKE 1 TABLET BY MOUTH ONCE DAILY FOR DIABETES       Thiazolidinedione Agents (TZDs)  Failed - 3/28/2020  4:00 PM        Failed - Patient has documented A1c within the specified period of time.     If HgbA1C is 8 or greater, it needs to be on file within the past 3 months.  If less than 8, must be on file within the past 6 months.     Recent Labs   Lab Test 06/04/19  1613   A1C 9.3*             Failed - Recent (6 mo) or future (30 days) visit within the authorizing provider's specialty     Patient had office visit in the last 6 months or has a visit in the next 30 days with authorizing provider or within the authorizing provider's specialty.  See \"Patient Info\" tab in inbasket, or \"Choose Columns\" in Meds & Orders section of the refill encounter.      Last Written Prescription Date:  2/25/20  Last Fill Quantity: 30,  # refills: 0   Last office visit: 7/30/2019 with prescribing provider:     Future Office Visit:              Passed - Patient has a normal ALT within the past 12 mos.     Recent Labs   Lab Test 06/04/19  1613   ALT 28             Passed - Patient has a normal AST within the past 12 mos.      Recent Labs   Lab Test 06/04/19  1613   AST 14             Passed - Diagnosis not CHF        Passed - Medication is active on med list        Passed - Patient is age 18 or older        Passed - Patient is not pregnant        Passed - Patient has a normal serum Creatinine in the past 12 months     Recent Labs   Lab Test 06/04/19  1613   CR 0.62       Ok to refill medication if creatinine is low          Passed - Patient has not had a positive pregnancy test within the past 12 mos.           glyBURIDE-metFORMIN (GLUCOVANCE) 5-500 MG tablet [Pharmacy Med Name: glyBURIDE-metFORMIN 5-500 MG Oral Tablet] 120 tablet 0     Sig: TAKE 2 TABLETS BY MOUTH " "TWICE DAILY WITH MEALS . APPOINTMENT REQUIRED FOR FUTURE REFILLS       Combination Oral Antihyperglycemic Agents Failed - 3/28/2020  4:00 PM        Failed - Patient has documented A1c within the specified period of time.     If HgbA1C is 8 or greater, it needs to be on file within the past 3 months.  If less than 8, must be on file within the past 6 months.     Recent Labs   Lab Test 06/04/19  1613   A1C 9.3*             Failed - Recent (6 mo) or future (30 days) visit within the authorizing provider's specialty     Patient had office visit in the last 6 months or has a visit in the next 30 days with authorizing provider or within the authorizing provider's specialty.  See \"Patient Info\" tab in inbasket, or \"Choose Columns\" in Meds & Orders section of the refill encounter.      Last Written Prescription Date:  2/25/20  Last Fill Quantity: 120,  # refills: 0   Last office visit: 7/30/2019 with prescribing provider:     Future Office Visit:              Passed - Patient's CR is NOT>1.4 OR Patient's EGFR is NOT<45 within past 12 mos.     Recent Labs   Lab Test 06/04/19  1613   GFRESTIMATED >90   GFRESTBLACK >90       Recent Labs   Lab Test 06/04/19  1613   CR 0.62             Passed - Patient does not have a diagnosis of CHF.        Passed - Medication is active on med list        Passed - Patient is 18 years old or older.        Passed - Patient is not pregnant        Passed - Patient has not had a positive pregnancy test within the past 12 mos.             "

## 2020-03-30 RX ORDER — PIOGLITAZONEHYDROCHLORIDE 15 MG/1
TABLET ORAL
Qty: 30 TABLET | Refills: 0 | Status: SHIPPED | OUTPATIENT
Start: 2020-03-30 | End: 2020-04-16

## 2020-03-30 RX ORDER — GLYBURIDE-METFORMIN HYDROCHLORIDE 5; 500 MG/1; MG/1
TABLET ORAL
Qty: 120 TABLET | Refills: 0 | Status: SHIPPED | OUTPATIENT
Start: 2020-03-30 | End: 2020-04-16

## 2020-03-30 NOTE — TELEPHONE ENCOUNTER
Za refill given.    Notify patient that she requires a clinic encounter (telephone visit) before she can receive additional refills.

## 2020-03-30 NOTE — TELEPHONE ENCOUNTER
Routing refill request to provider for review/approval because:  Patient needs to be seen because:  Due for diabetic recheck      Martha Burton RN, BSN, PHN

## 2020-03-31 NOTE — TELEPHONE ENCOUNTER
This writer attempted to contact patient  on 03/31/20      Reason for call medication has been refilled as a arnol refill, patient is due for a telephone visit before patient's next refill, patient may call our appointment line to schedule and left message.      If patient  calls back:   Schedule Telephone Visit appointment within 1 month with primary care.         Vj Lawton

## 2020-04-16 ENCOUNTER — VIRTUAL VISIT (OUTPATIENT)
Dept: FAMILY MEDICINE | Facility: CLINIC | Age: 60
End: 2020-04-16
Payer: COMMERCIAL

## 2020-04-16 DIAGNOSIS — E78.5 HYPERLIPIDEMIA LDL GOAL <100: ICD-10-CM

## 2020-04-16 DIAGNOSIS — E11.65 TYPE 2 DIABETES MELLITUS WITH HYPERGLYCEMIA, WITHOUT LONG-TERM CURRENT USE OF INSULIN (H): Primary | ICD-10-CM

## 2020-04-16 DIAGNOSIS — I10 ESSENTIAL HYPERTENSION WITH GOAL BLOOD PRESSURE LESS THAN 140/90: ICD-10-CM

## 2020-04-16 PROCEDURE — 99214 OFFICE O/P EST MOD 30 MIN: CPT | Mod: 95 | Performed by: INTERNAL MEDICINE

## 2020-04-16 RX ORDER — ATORVASTATIN CALCIUM 40 MG/1
40 TABLET, FILM COATED ORAL DAILY
Qty: 90 TABLET | Refills: 1 | Status: SHIPPED | OUTPATIENT
Start: 2020-04-16 | End: 2020-11-30

## 2020-04-16 RX ORDER — LISINOPRIL 10 MG/1
10 TABLET ORAL DAILY
Qty: 90 TABLET | Refills: 1 | Status: SHIPPED | OUTPATIENT
Start: 2020-04-16 | End: 2020-10-22

## 2020-04-16 RX ORDER — PIOGLITAZONEHYDROCHLORIDE 15 MG/1
15 TABLET ORAL DAILY
Qty: 90 TABLET | Refills: 1 | Status: SHIPPED | OUTPATIENT
Start: 2020-04-16 | End: 2020-10-22

## 2020-04-16 RX ORDER — GLYBURIDE-METFORMIN HYDROCHLORIDE 5; 500 MG/1; MG/1
2 TABLET ORAL 2 TIMES DAILY WITH MEALS
Qty: 360 TABLET | Refills: 1 | Status: SHIPPED | OUTPATIENT
Start: 2020-04-16 | End: 2020-10-22

## 2020-04-16 NOTE — PROGRESS NOTES
"Alfa Sung is a 59 year old female who is being evaluated via a billable telephone visit.      The patient has been notified of following:     \"This telephone visit will be conducted via a call between you and your physician/provider. We have found that certain health care needs can be provided without the need for a physical exam.  This service lets us provide the care you need with a short phone conversation.  If a prescription is necessary we can send it directly to your pharmacy.  If lab work is needed we can place an order for that and you can then stop by our lab to have the test done at a later time.    Telephone visits are billed at different rates depending on your insurance coverage. During this emergency period, for some insurers they may be billed the same as an in-person visit.  Please reach out to your insurance provider with any questions.    If during the course of the call the physician/provider feels a telephone visit is not appropriate, you will not be charged for this service.\"    Patient has given verbal consent for Telephone visit?  Yes    How would you like to obtain your AVS? Mail a copy    Subjective     Alfa Sung is a 59 year old female who presents to clinic today for the following health issues:           Diabetes Follow-up      Patient is checking blood sugars: not at all    Diabetic concerns: other - significant glucose variability from 85 to more than 200     Symptoms of hypoglycemia (low blood sugar): none     Paresthesias (numbness or burning in feet) or sores: No, but patient started to complain of numbness of both arms, worse at night without motor weakness.     Date of last diabetic eye exam: 5/16/2019        Hypertension Follow-up      Outpatient blood pressures monitoring: no    Low Salt Diet: no    Adverse effects: none    Compliance: good    Secondary causes: none    Chronic kidney disease: no    Hyperthyroidism: no    Anxiety: no    Decongestants: no    Substance abuse: " no    Diabetes: yes    Ischemic heart disease: no    Stroke: no    Hyperlipidemia: yes       Hyperlipidemia Follow-Up      Are you regularly taking any medication or supplement to lower your cholesterol?   Yes- Atorvastatin.    Are you having muscle aches or other side effects that you think could be caused by your cholesterol lowering medication?  No      Patient Active Problem List   Diagnosis     Diabetes mellitus, type II (H)     Essential hypertension with goal blood pressure less than 140/90     Hyperlipidemia LDL goal <100     Primary osteoarthritis of left knee     Pseudophakia     Pterygium eye, right     Past Surgical History:   Procedure Laterality Date     CATARACT IOL, RT/LT Bilateral     2013       Social History     Tobacco Use     Smoking status: Never Smoker     Smokeless tobacco: Never Used   Substance Use Topics     Alcohol use: No     Family History   Problem Relation Age of Onset     Glaucoma No family hx of      Macular Degeneration No family hx of          Current Outpatient Medications   Medication Sig Dispense Refill     ASPIRIN PO Take 81 mg by mouth daily (with dinner)       atorvastatin (LIPITOR) 40 MG tablet Take 1 tablet (40 mg) by mouth daily For cholesterol. 90 tablet 1     blood glucose monitoring (ONETOUCH ULTRA) test strip Dispense item covered by pt ins. E11.9 NIDDM type II - Test 2 times/day. Reason: High A1C       Blood Glucose Monitoring Suppl (FIFTY50 GLUCOSE METER 2.0) W/DEVICE KIT Dispense meter, test strips, lancets covered by pt ins. E11.65 NIDDM type II, uncontrolled - Test 3 times/day, Reason: High A1C       glyBURIDE-metFORMIN (GLUCOVANCE) 5-500 MG tablet Take 2 tablets by mouth 2 times daily (with meals) 360 tablet 1     lisinopril (ZESTRIL) 10 MG tablet Take 1 tablet (10 mg) by mouth daily For blood pressure. 90 tablet 1     pioglitazone (ACTOS) 15 MG tablet Take 1 tablet (15 mg) by mouth daily 90 tablet 1     No Known Allergies  Recent Labs   Lab Test 06/04/19  3065  06/04/19  1613 10/10/18  1115 04/09/18  0741   A1C  --  9.3* 10.6* 9.2*   *  --  145* 132*   HDL  --   --  49* 68   TRIG  --   --  125 59   ALT  --  28 30 22   CR  --  0.62 0.63 0.53   GFRESTIMATED  --  >90 >90 >90   GFRESTBLACK  --  >90 >90 >90   POTASSIUM  --  4.0 4.5 4.3   TSH  --   --   --  0.40      BP Readings from Last 3 Encounters:   07/30/19 (!) 159/81   06/04/19 134/72   10/10/18 139/70    Wt Readings from Last 3 Encounters:   07/30/19 61.2 kg (135 lb)   06/04/19 58.5 kg (129 lb)   10/10/18 60.2 kg (132 lb 12.8 oz)                    Reviewed and updated as needed this visit by Provider         Review of Systems   ROS COMP: CONSTITUTIONAL: NEGATIVE for fever, chills, change in weight  INTEGUMENTARY/SKIN: NEGATIVE for worrisome rashes, moles or lesions  EYES: NEGATIVE for vision changes or irritation  ENT/MOUTH: NEGATIVE for ear, mouth and throat problems  RESP: NEGATIVE for significant cough or SOB  BREAST: NEGATIVE for masses, tenderness or discharge  CV: NEGATIVE for chest pain, palpitations or peripheral edema  GI: NEGATIVE for nausea, abdominal pain, heartburn, or change in bowel habits  : NEGATIVE for frequency, dysuria, or hematuria  MUSCULOSKELETAL: NEGATIVE for significant arthralgias or myalgia  NEURO: POSITIVE for nocturnal paresthesia of both upper extremities.  ENDOCRINE: NEGATIVE for temperature intolerance, skin/hair changes  HEME: NEGATIVE for bleeding problems  PSYCHIATRIC: NEGATIVE for changes in mood or affect       Objective   Reported vitals:  There were no vitals taken for this visit.     Remainder of exam unable to be completed due to telephone visits    Diagnostic Test Results:  Labs reviewed in Epic        Assessment/Plan:  1. Type 2 diabetes mellitus with hyperglycemia, without long-term current use of insulin (H)    - glyBURIDE-metFORMIN (GLUCOVANCE) 5-500 MG tablet; Take 2 tablets by mouth 2 times daily (with meals)  Dispense: 360 tablet; Refill: 1  - pioglitazone  (ACTOS) 15 MG tablet; Take 1 tablet (15 mg) by mouth daily  Dispense: 90 tablet; Refill: 1  - Hemoglobin A1c; Future  - Comprehensive metabolic panel (BMP + Alb, Alk Phos, ALT, AST, Total. Bili, TP); Future  - Lipid panel reflex to direct LDL Fasting; Future  - Albumin Random Urine Quantitative with Creat Ratio; Future    2. Hyperlipidemia LDL goal <100    - atorvastatin (LIPITOR) 40 MG tablet; Take 1 tablet (40 mg) by mouth daily For cholesterol.  Dispense: 90 tablet; Refill: 1    3. Essential hypertension with goal blood pressure less than 140/90    - lisinopril (ZESTRIL) 10 MG tablet; Take 1 tablet (10 mg) by mouth daily For blood pressure.  Dispense: 90 tablet; Refill: 1    Follow-up in 6 months for annual physical exam.    Phone call duration:  17  minutes  Call started: 4:10 PM  Call ended: 4:27 PM    Lenard Reid MD

## 2020-04-23 ENCOUNTER — TELEPHONE (OUTPATIENT)
Dept: FAMILY MEDICINE | Facility: CLINIC | Age: 60
End: 2020-04-23

## 2020-05-22 DIAGNOSIS — I10 ESSENTIAL HYPERTENSION WITH GOAL BLOOD PRESSURE LESS THAN 140/90: ICD-10-CM

## 2020-05-22 DIAGNOSIS — E11.65 TYPE 2 DIABETES MELLITUS WITH HYPERGLYCEMIA, WITHOUT LONG-TERM CURRENT USE OF INSULIN (H): ICD-10-CM

## 2020-05-22 LAB
ALBUMIN SERPL-MCNC: 4.3 G/DL (ref 3.4–5)
ALP SERPL-CCNC: 53 U/L (ref 40–150)
ALT SERPL W P-5'-P-CCNC: 23 U/L (ref 0–50)
ANION GAP SERPL CALCULATED.3IONS-SCNC: 9 MMOL/L (ref 3–14)
AST SERPL W P-5'-P-CCNC: 15 U/L (ref 0–45)
BILIRUB SERPL-MCNC: 0.3 MG/DL (ref 0.2–1.3)
BUN SERPL-MCNC: 18 MG/DL (ref 7–30)
CALCIUM SERPL-MCNC: 9.3 MG/DL (ref 8.5–10.1)
CHLORIDE SERPL-SCNC: 104 MMOL/L (ref 94–109)
CHOLEST SERPL-MCNC: 127 MG/DL
CO2 SERPL-SCNC: 27 MMOL/L (ref 20–32)
CREAT SERPL-MCNC: 0.68 MG/DL (ref 0.52–1.04)
CREAT UR-MCNC: 161 MG/DL
GFR SERPL CREATININE-BSD FRML MDRD: >90 ML/MIN/{1.73_M2}
GLUCOSE SERPL-MCNC: 68 MG/DL (ref 70–99)
HBA1C MFR BLD: 8.4 % (ref 0–5.6)
HDLC SERPL-MCNC: 67 MG/DL
LDLC SERPL CALC-MCNC: 40 MG/DL
MICROALBUMIN UR-MCNC: 23 MG/L
MICROALBUMIN/CREAT UR: 14.22 MG/G CR (ref 0–25)
NONHDLC SERPL-MCNC: 60 MG/DL
POTASSIUM SERPL-SCNC: 3.7 MMOL/L (ref 3.4–5.3)
PROT SERPL-MCNC: 7.5 G/DL (ref 6.8–8.8)
SODIUM SERPL-SCNC: 140 MMOL/L (ref 133–144)
TRIGL SERPL-MCNC: 99 MG/DL
TSH SERPL DL<=0.005 MIU/L-ACNC: 1 MU/L (ref 0.4–4)
VIT B12 SERPL-MCNC: 458 PG/ML (ref 193–986)

## 2020-05-22 PROCEDURE — 36415 COLL VENOUS BLD VENIPUNCTURE: CPT | Performed by: INTERNAL MEDICINE

## 2020-05-22 PROCEDURE — 80061 LIPID PANEL: CPT | Performed by: INTERNAL MEDICINE

## 2020-05-22 PROCEDURE — 83036 HEMOGLOBIN GLYCOSYLATED A1C: CPT | Performed by: INTERNAL MEDICINE

## 2020-05-22 PROCEDURE — 82607 VITAMIN B-12: CPT | Performed by: INTERNAL MEDICINE

## 2020-05-22 PROCEDURE — 83921 ORGANIC ACID SINGLE QUANT: CPT | Performed by: INTERNAL MEDICINE

## 2020-05-22 PROCEDURE — 84443 ASSAY THYROID STIM HORMONE: CPT | Performed by: INTERNAL MEDICINE

## 2020-05-22 PROCEDURE — 82043 UR ALBUMIN QUANTITATIVE: CPT | Performed by: INTERNAL MEDICINE

## 2020-05-22 PROCEDURE — 80053 COMPREHEN METABOLIC PANEL: CPT | Performed by: INTERNAL MEDICINE

## 2020-05-28 LAB — METHYLMALONATE SERPL-SCNC: 0.53 UMOL/L (ref 0–0.4)

## 2020-06-04 ENCOUNTER — TELEPHONE (OUTPATIENT)
Dept: FAMILY MEDICINE | Facility: CLINIC | Age: 60
End: 2020-06-04

## 2020-06-04 NOTE — LETTER
"June 5, 2020      Alfa Sung  [address] [facility] [address]            Dear ,    We are writing to inform you of your test results.    Lipid panel is now normal in comparison to last year.   Diabetes is better.   Kidney and liver function are normal.   Electrolytes (\"minerals\" such as potassium, chloride, sodium and calcium) are normal.   Continue current medications.     Follow-up in 6 months.       If you have any questions or concerns, please call the clinic at the number listed above.       Sincerely,    Lenard Reid MD                  "

## 2020-06-04 NOTE — TELEPHONE ENCOUNTER
"This writer attempted to contact patient on 06/04/20      Reason for call result and left message.      If patient calls back:   1st floor [facility] Care Team (MA/TC) called. Inform patient that someone from the team will contact them, document that pt called and route to care team.         Liliam Harper MA        Notes recorded by Lenard Reid MD on 5/22/2020 at 7:02 PM CDT   Lipid panel is now normal in comparison to last year.   Diabetes is better.   Kidney and liver function are normal.   Electrolytes (\"minerals\" such as potassium, chloride, sodium and calcium) are normal.   Continue current medications.     Follow-up in 6 months.     (Call patient via ).   "

## 2020-08-27 ENCOUNTER — OFFICE VISIT (OUTPATIENT)
Dept: FAMILY MEDICINE | Facility: CLINIC | Age: 60
End: 2020-08-27
Payer: COMMERCIAL

## 2020-08-27 ENCOUNTER — ANCILLARY PROCEDURE (OUTPATIENT)
Dept: GENERAL RADIOLOGY | Facility: CLINIC | Age: 60
End: 2020-08-27
Attending: PHYSICIAN ASSISTANT
Payer: COMMERCIAL

## 2020-08-27 VITALS
HEART RATE: 90 BPM | OXYGEN SATURATION: 98 % | BODY MASS INDEX: 28.27 KG/M2 | SYSTOLIC BLOOD PRESSURE: 150 MMHG | DIASTOLIC BLOOD PRESSURE: 78 MMHG | RESPIRATION RATE: 18 BRPM | HEIGHT: 60 IN | TEMPERATURE: 98.8 F | WEIGHT: 144 LBS

## 2020-08-27 DIAGNOSIS — S83.91XA SPRAIN OF RIGHT KNEE, UNSPECIFIED LIGAMENT, INITIAL ENCOUNTER: Primary | ICD-10-CM

## 2020-08-27 DIAGNOSIS — M17.11 ARTHRITIS OF RIGHT KNEE: ICD-10-CM

## 2020-08-27 DIAGNOSIS — M25.561 ACUTE PAIN OF RIGHT KNEE: ICD-10-CM

## 2020-08-27 PROCEDURE — 99213 OFFICE O/P EST LOW 20 MIN: CPT | Performed by: PHYSICIAN ASSISTANT

## 2020-08-27 PROCEDURE — 73560 X-RAY EXAM OF KNEE 1 OR 2: CPT | Mod: RT

## 2020-08-27 RX ORDER — NAPROXEN 500 MG/1
500 TABLET ORAL 2 TIMES DAILY WITH MEALS
Qty: 30 TABLET | Refills: 1 | Status: SHIPPED | OUTPATIENT
Start: 2020-08-27 | End: 2021-03-15

## 2020-08-27 ASSESSMENT — PAIN SCALES - GENERAL: PAINLEVEL: EXTREME PAIN (8)

## 2020-08-27 ASSESSMENT — MIFFLIN-ST. JEOR: SCORE: 1139.68

## 2020-08-27 NOTE — PROGRESS NOTES
Subjective     Alfa Sung is a 59 year old female who presents to clinic today for the following health issues:    HPI       Musculoskeletal problem/pain  Onset/Duration: 1 week  Description  Location: knee - right  Joint Swelling: no  Redness: no  Pain: no  Warmth: no  Intensity:  7-8/10  Progression of Symptoms:  worsening  Accompanying signs and symptoms:   Fevers: no  Numbness/tingling/weakness: no  History  Trauma to the area: no  Recent illness:  no  Previous similar problem: no  Previous evaluation:  no  Precipitating or alleviating factors:  Aggravating factors include: walking  Therapies tried and outcome: nothing      Review of Systems   Constitutional, HEENT, cardiovascular, pulmonary, gi and gu systems are negative, except as otherwise noted.      Objective    BP (!) 150/78   Pulse 90   Temp 98.8  F (37.1  C) (Oral)   Resp 18   Ht 1.524 m (5')   Wt 65.3 kg (144 lb)   LMP  (Exact Date)   SpO2 98%   Breastfeeding No   BMI 28.12 kg/m    Body mass index is 28.12 kg/m .     Physical Exam   GENERAL: healthy, alert and no distress  MS:   GAIT: NORMAL  Dorsalis Pedis pulses intact bilaterally.  MUSCULOSKELETAL:  RIGHT KNEE   Inspection: AP/lateral alignment normal  Tender: lateral joint line, medial joint line  Non-tender: lateral patellar facet, medial patellar facet, quadriceps insertion, popliteal region  Active Range of Motion: full flexion, no pain with flexion, full extension, no pain with extension    Special tests: normal Valgus stress test, normal Varus, negative Hi's   No warmth noted over bilateral knees.       Xray - independent read-moderate arthritic changes         Assessment & Plan     Alfa was seen today for knee pain.    Diagnoses and all orders for this visit:    Sprain of right knee, unspecified ligament, initial encounter  -     XR Knee Right 1/2 Views; Future  -     naproxen (NAPROSYN) 500 MG tablet; Take 1 tablet (500 mg) by mouth 2 times daily (with meals)  -     Knee  Supplies Order for DME - ONLY FOR DME    Arthritis of right knee    current pain is most likely due to acute sprain   Wear brace  Naproxen 1 tablet twice a day as needed with food for pain    For arthritis changes, please see ortho for an injection  BMI:   Estimated body mass index is 28.12 kg/m  as calculated from the following:    Height as of this encounter: 1.524 m (5').    Weight as of this encounter: 65.3 kg (144 lb).   Weight management plan: Discussed healthy diet and exercise guidelines      Return in about 1 month (around 9/27/2020) for ortho.    Lore Jacob PA-C  [facility]

## 2020-08-27 NOTE — PATIENT INSTRUCTIONS
Wear brace  Naproxen 1 tablet twice a day as needed with food for pain    Patient Education     Knee Sprain    A sprain is an injury to the ligaments or capsule that holds a joint together. There are no broken bones. Most sprains take 3 to 6 weeks to heal. If it a severe sprain where the ligament is completely torn, it can take months to recover.  Most knee sprains are treated with a splint, knee immobilizer brace, or elastic wrap for support. Severe sprains may rarely require surgery.  Home care    Stay off the injured leg as much as possible until you can walk on it without pain. If you have a lot of pain with walking, crutches or a walker may be prescribed. (These can be rented or purchased at many pharmacies and surgical or orthopedic supply stores). Follow your healthcare provider's advice about when to begin putting weight on that leg.    Keep your leg elevated to reduce pain and swelling. When sleeping, place a pillow under the injured leg. When sitting, support the injured leg so it is above heart level. This is very important during the first 48 hours.    Apply an ice pack over the injured area for 15 to 20 minutes every 3 to 6 hours. You should do this for the first 24 to 48 hours. You can make an ice pack by filling a plastic bag that seals at the top with ice cubes and then wrapping it with a thin towel. Continue to use ice packs for relief of pain and swelling as needed. As the ice melts, be careful to avoid getting your wrap, splint, or cast wet. After 48 hours, apply heat (warm shower or warm bath) for 15 to 20 minutes several times a day, or alternate ice and heat. You can place the ice pack directly over the splint. If you have to wear a hook-and-loop knee brace, you can open it to apply the ice pack, or heat, directly to the knee. Never put ice directly on the skin. Always wrap the ice in a towel or other type of cloth.    You may use over-the-counter pain medicine to control pain, unless another  pain medicine was prescribed. If you have chronic liver or kidney disease or ever had a stomach ulcer or gastrointestinal bleeding, talk with your healthcare provider before using these medicines.    If you were given a splint, keep it completely dry at all times. Bathe with your splint out of the water, protected with 2 large plastic bags, sealed with rubber bands or tape at the top end. If a fiberglass splint gets wet, you can dry it with a hair dryer set to cool. If you have a hook-and-loop knee brace, you can remove this to bathe, unless told otherwise.  Follow-up care  Follow up with your doctor as advised. Any X-rays you had today don t show any broken bones, breaks, or fractures. Sometimes fractures don t show up on the first X-ray. Bruises and sprains can sometimes hurt as much as a fracture. These injuries can take time to heal completely. If your symptoms don t improve or they get worse, talk with your doctor. You may need a repeat X-ray. If X-rays were taken, you will be told of any new findings that may affect your care.  Call 911  Call 911 if you have:     Shortness of breath     Chest pain  When to seek medical advice  Call your healthcare provider right away if any of these occur:    The splint or knee immobilizer brace becomes wet or soft    The fiberglass cast or splint remains wet for more than 24 hours    Pain or swelling increases    The injured leg or toes become cold, blue, numb, or tingly  Date Last Reviewed: 5/1/2018 2000-2019 [employer]. [address] [place] [address], [place] [address]. All rights reserved. This information is not intended as a substitute for professional medical care. Always follow your healthcare professional's instructions.

## 2020-08-28 ENCOUNTER — TELEPHONE (OUTPATIENT)
Dept: FAMILY MEDICINE | Facility: CLINIC | Age: 60
End: 2020-08-28

## 2020-08-28 NOTE — TELEPHONE ENCOUNTER
Patient called back and was informed of the below per provider documentation. Patient verbalizes understanding.     Katya Cortes RN  [facility]

## 2020-08-28 NOTE — RESULT ENCOUNTER NOTE
Please call the patient with these results:      Patient has arthritis in the knee. I have placed a referral to see ortho for injection. Patient will be called by a scheduling teat to set an appointment.     Emelia Jacob PAC

## 2020-08-28 NOTE — TELEPHONE ENCOUNTER
This writer attempted to contact [facility] on 08/28/20 via [place]  ID#[id number]      Reason for call results and left message.      If patient calls back:   Registered Nurse called. Follow Triage Call workflow        DORA Young Yekaterina Olegovna, PA-C   8/27/2020  7:03 PM       Please call the patient with these results:           Patient has arthritis in the knee. I have placed a referral to see ortho for injection. Patient will be called by a scheduling teat to set an appointment.       Emelia Jacob PAC

## 2020-09-22 NOTE — PROGRESS NOTES
SUBJECTIVE:   Alfa Sung is a 59 year old female who is seen in consultation at the request of Lore Jacob, for evaluation of right knee pain.  Duration: 5 weeks   No known injury.    Present symptoms: pain medial and lateral  No swelling  Pain to walk, first getting up.  No pain flexing.  Pain medial with full extension.   Catching, locking or giving-way?: no.    Treatments tried to this point: NSAIDs knee sleeve, no help.    Previous knee issues: no previous problems with the knee    Past Medical History:   Past Medical History:   Diagnosis Date     Pterygium of eye      Past Surgical History:   Past Surgical History:   Procedure Laterality Date     CATARACT IOL, RT/LT Bilateral     2013     Family History:   Family History   Problem Relation Age of Onset     Glaucoma No family hx of      Macular Degeneration No family hx of      Social History:   Social History     Tobacco Use     Smoking status: Never Smoker     Smokeless tobacco: Never Used   Substance Use Topics     Alcohol use: No       Review of Systems:  Constitutional:  NEGATIVE for fever, chills, change in weight  Integumentary/Skin:  NEGATIVE for worrisome rashes, moles or lesions  Eyes:  NEGATIVE for vision changes or irritation  ENT/Mouth:  NEGATIVE for ear, mouth and throat problems  Resp:  NEGATIVE for significant cough or SOB  Breast:  NEGATIVE for masses, tenderness or discharge  CV:  NEGATIVE for chest pain, palpitations or peripheral edema  GI:  NEGATIVE for nausea, abdominal pain, heartburn, or change in bowel habits  :  Negative   Musculoskeletal:  See HPI above  Neuro:  NEGATIVE for weakness, dizziness or paresthesias  Endocrine:  NEGATIVE for temperature intolerance, skin/hair changes  Heme/allergy/immune:  NEGATIVE for bleeding problems  Psychiatric:  NEGATIVE for changes in mood or affect      OBJECTIVE:  Physical Exam:  There were no vitals taken for this visit.  General Appearance: healthy, alert and no distress    Skin: no suspicious lesions or rashes  Neuro: Normal strength and tone, mentation intact and speech normal  Vascular: good pulses, and cappillary refill   Lymph: no lymphadenopathy   Psych:  mentation appears normal and affect normal/bright  Resp: no increased work of breathing     Right Knee Exam:  Gait: walks with antalgic gait favoring right side  Alignment: normal   Squat: 50 % painful.    Patellofemoral joint: mild crepitations in the patellofemoral joint.  Effusion: none  ROM: full  Tender: medial joint line, lateral joint line, medial facet of the patella, lateral facet of the patella and pes anserine  Masses: no  Ligaments:   Lachman's: stable   Anterior/Posterior drawer: stable,   Varus/Valgus stress: stable to varus and valgus stress  McMurrays: positive laterally    X-rays: these were apparently done SUPINE:  Obtained 8/27, reviewed in the office with the patient today:    Mild narrowing and hypertrophic change in the medial  compartment. Mild hypertrophic change of the patellofemoral  compartment. Multiple subcentimeter posterior intra-articular bodies.    No knee joint effusion. Atheromatous calcification    MRI:  None     ASSESSMENT:   Osteoarthritis, with uncertain severity  Possible loose bodies, but I would guess these are adherent to the posterior capsule, and not loose..  Possible pes bursitis    PLAN:   With the patient's consent, right knee(s) injected intra-articularly with 80mg of Depomedrol and 4cc of local anesthetic after sterile prep.  If no  Improvement consider new, STANDING xrays, then possibly MRI    Return to clinic: 6 weeks or as needed     BETO Hawk MD  Dept. Orthopedic Surgery  [facility]

## 2020-09-23 ENCOUNTER — OFFICE VISIT (OUTPATIENT)
Dept: ORTHOPEDICS | Facility: CLINIC | Age: 60
End: 2020-09-23
Payer: COMMERCIAL

## 2020-09-23 VITALS
HEIGHT: 60 IN | HEART RATE: 85 BPM | OXYGEN SATURATION: 99 % | WEIGHT: 143 LBS | BODY MASS INDEX: 28.07 KG/M2 | DIASTOLIC BLOOD PRESSURE: 82 MMHG | SYSTOLIC BLOOD PRESSURE: 152 MMHG

## 2020-09-23 DIAGNOSIS — M17.11 PRIMARY OSTEOARTHRITIS OF RIGHT KNEE: Primary | ICD-10-CM

## 2020-09-23 DIAGNOSIS — M23.41 LOOSE BODY OF RIGHT KNEE: ICD-10-CM

## 2020-09-23 PROCEDURE — 99203 OFFICE O/P NEW LOW 30 MIN: CPT | Mod: 25 | Performed by: ORTHOPAEDIC SURGERY

## 2020-09-23 PROCEDURE — 20610 DRAIN/INJ JOINT/BURSA W/O US: CPT | Mod: RT | Performed by: ORTHOPAEDIC SURGERY

## 2020-09-23 RX ORDER — METHYLPREDNISOLONE ACETATE 80 MG/ML
80 INJECTION, SUSPENSION INTRA-ARTICULAR; INTRALESIONAL; INTRAMUSCULAR; SOFT TISSUE
Status: DISCONTINUED | OUTPATIENT
Start: 2020-09-23 | End: 2021-03-15

## 2020-09-23 RX ORDER — LIDOCAINE HYDROCHLORIDE 10 MG/ML
4 INJECTION, SOLUTION EPIDURAL; INFILTRATION; INTRACAUDAL; PERINEURAL
Status: DISCONTINUED | OUTPATIENT
Start: 2020-09-23 | End: 2021-03-15

## 2020-09-23 RX ADMIN — LIDOCAINE HYDROCHLORIDE 4 ML: 10 INJECTION, SOLUTION EPIDURAL; INFILTRATION; INTRACAUDAL; PERINEURAL at 16:36

## 2020-09-23 RX ADMIN — METHYLPREDNISOLONE ACETATE 80 MG: 80 INJECTION, SUSPENSION INTRA-ARTICULAR; INTRALESIONAL; INTRAMUSCULAR; SOFT TISSUE at 16:36

## 2020-09-23 ASSESSMENT — MIFFLIN-ST. JEOR: SCORE: 1135.14

## 2020-09-23 NOTE — PROGRESS NOTES
Large Joint Injection/Arthocentesis: R knee joint    Date/Time: 9/23/2020 4:36 PM  Performed by: Misael Bauman  Authorized by: Amrit Hawk MD     Indications:  Pain and osteoarthritis  Needle Size:  22 G  Guidance: landmark guided    Approach:  Anterolateral  Location:  Knee      Medications:  80 mg methylPREDNISolone 80 MG/ML; 4 mL lidocaine (PF) 1 %  Outcome:  Tolerated well, no immediate complications  Procedure discussed: discussed risks, benefits, and alternatives    Consent Given by:  Patient  Timeout: timeout called immediately prior to procedure    Prep: patient was prepped and draped in usual sterile fashion

## 2020-09-23 NOTE — LETTER
9/23/2020         RE: Alfa Sung  [address] [facility] [address]        Dear Colleague,    Thank you for referring your patient, Alfa Sung, to the [facility]. Please see a copy of my visit note below.    SUBJECTIVE:   Alfa Sung is a 59 year old female who is seen in consultation at the request of Lore Jacob, for evaluation of right knee pain.  Duration: 5 weeks   No known injury.    Present symptoms: pain medial and lateral  No swelling  Pain to walk, first getting up.  No pain flexing.  Pain medial with full extension.   Catching, locking or giving-way?: no.    Treatments tried to this point: NSAIDs knee sleeve, no help.    Previous knee issues: no previous problems with the knee    Past Medical History:   Past Medical History:   Diagnosis Date     Pterygium of eye      Past Surgical History:   Past Surgical History:   Procedure Laterality Date     CATARACT IOL, RT/LT Bilateral     2013     Family History:   Family History   Problem Relation Age of Onset     Glaucoma No family hx of      Macular Degeneration No family hx of      Social History:   Social History     Tobacco Use     Smoking status: Never Smoker     Smokeless tobacco: Never Used   Substance Use Topics     Alcohol use: No       Review of Systems:  Constitutional:  NEGATIVE for fever, chills, change in weight  Integumentary/Skin:  NEGATIVE for worrisome rashes, moles or lesions  Eyes:  NEGATIVE for vision changes or irritation  ENT/Mouth:  NEGATIVE for ear, mouth and throat problems  Resp:  NEGATIVE for significant cough or SOB  Breast:  NEGATIVE for masses, tenderness or discharge  CV:  NEGATIVE for chest pain, palpitations or peripheral edema  GI:  NEGATIVE for nausea, abdominal pain, heartburn, or change in bowel habits  :  Negative   Musculoskeletal:  See HPI above  Neuro:  NEGATIVE for weakness, dizziness or paresthesias  Endocrine:  NEGATIVE for temperature intolerance, skin/hair  changes  Heme/allergy/immune:  NEGATIVE for bleeding problems  Psychiatric:  NEGATIVE for changes in mood or affect      OBJECTIVE:  Physical Exam:  There were no vitals taken for this visit.  General Appearance: healthy, alert and no distress   Skin: no suspicious lesions or rashes  Neuro: Normal strength and tone, mentation intact and speech normal  Vascular: good pulses, and cappillary refill   Lymph: no lymphadenopathy   Psych:  mentation appears normal and affect normal/bright  Resp: no increased work of breathing     Right Knee Exam:  Gait: walks with antalgic gait favoring right side  Alignment: normal   Squat: 50 % painful.    Patellofemoral joint: mild crepitations in the patellofemoral joint.  Effusion: none  ROM: full  Tender: medial joint line, lateral joint line, medial facet of the patella, lateral facet of the patella and pes anserine  Masses: no  Ligaments:   Lachman's: stable   Anterior/Posterior drawer: stable,   Varus/Valgus stress: stable to varus and valgus stress  McMurrays: positive laterally    X-rays: these were apparently done SUPINE:  Obtained 8/27, reviewed in the office with the patient today:    Mild narrowing and hypertrophic change in the medial  compartment. Mild hypertrophic change of the patellofemoral  compartment. Multiple subcentimeter posterior intra-articular bodies.    No knee joint effusion. Atheromatous calcification    MRI:  None     ASSESSMENT:   Osteoarthritis, with uncertain severity  Possible loose bodies, but I would guess these are adherent to the posterior capsule, and not loose..  Possible pes bursitis    PLAN:   With the patient's consent, right knee(s) injected intra-articularly with 80mg of Depomedrol and 4cc of local anesthetic after sterile prep.  If no  Improvement consider new, STANDING xrays, then possibly MRI    Return to clinic: 6 weeks or as needed     BETO Hawk MD  Dept. Orthopedic Surgery  [facility]     Large Joint  Injection/Arthocentesis: R knee joint    Date/Time: 9/23/2020 4:36 PM  Performed by: Misael Bauman  Authorized by: Amrit Hawk MD     Indications:  Pain and osteoarthritis  Needle Size:  22 G  Guidance: landmark guided    Approach:  Anterolateral  Location:  Knee      Medications:  80 mg methylPREDNISolone 80 MG/ML; 4 mL lidocaine (PF) 1 %  Outcome:  Tolerated well, no immediate complications  Procedure discussed: discussed risks, benefits, and alternatives    Consent Given by:  Patient  Timeout: timeout called immediately prior to procedure    Prep: patient was prepped and draped in usual sterile fashion            Again, thank you for allowing me to participate in the care of your patient.        Sincerely,        Amrit Hawk MD

## 2020-10-21 DIAGNOSIS — E11.65 TYPE 2 DIABETES MELLITUS WITH HYPERGLYCEMIA, WITHOUT LONG-TERM CURRENT USE OF INSULIN (H): ICD-10-CM

## 2020-10-21 DIAGNOSIS — I10 ESSENTIAL HYPERTENSION WITH GOAL BLOOD PRESSURE LESS THAN 140/90: ICD-10-CM

## 2020-10-21 NOTE — LETTER
October 23, 2020      Alfa Sung  [address] [facility] [address]        Dear Ms.Ana Lilia,      At [facility] we care about your health and are committed to providing quality patient care. Regular appointments are a vital part of the care and management of your health and can help prevent many of the complications that can occur.       It has come to our attention that you have been given a one time refill of your medications and that you are due for a visit with your provider for further refills.  Please call [facility] at [phone] or use [employer] to schedule your appointment.       Sincerely,   [facility] Care Team

## 2020-10-22 RX ORDER — PIOGLITAZONEHYDROCHLORIDE 15 MG/1
TABLET ORAL
Qty: 30 TABLET | Refills: 0 | Status: SHIPPED | OUTPATIENT
Start: 2020-10-22 | End: 2020-11-30

## 2020-10-22 RX ORDER — LISINOPRIL 10 MG/1
TABLET ORAL
Qty: 30 TABLET | Refills: 0 | Status: SHIPPED | OUTPATIENT
Start: 2020-10-22 | End: 2020-11-30

## 2020-10-22 RX ORDER — GLYBURIDE-METFORMIN HYDROCHLORIDE 5; 500 MG/1; MG/1
TABLET ORAL
Qty: 120 TABLET | Refills: 0 | Status: SHIPPED | OUTPATIENT
Start: 2020-10-22 | End: 2020-11-30

## 2020-10-22 NOTE — TELEPHONE ENCOUNTER
Routing refill request to provider for review/approval because:  Labs not current:  hgb a1c  BP Readings from Last 3 Encounters:   09/23/20 (!) 152/82   08/27/20 (!) 150/78   07/30/19 (!) 159/81     Viviana NOGUERAN, RN

## 2020-10-23 NOTE — TELEPHONE ENCOUNTER
Reminder letter to schedule needed appt for further refills mailed to pt's home address.      Miriam FERRER (R))

## 2020-11-30 ENCOUNTER — OFFICE VISIT (OUTPATIENT)
Dept: FAMILY MEDICINE | Facility: CLINIC | Age: 60
End: 2020-11-30
Payer: COMMERCIAL

## 2020-11-30 VITALS
HEIGHT: 60 IN | OXYGEN SATURATION: 97 % | SYSTOLIC BLOOD PRESSURE: 168 MMHG | HEART RATE: 87 BPM | WEIGHT: 139 LBS | BODY MASS INDEX: 27.29 KG/M2 | TEMPERATURE: 98.3 F | DIASTOLIC BLOOD PRESSURE: 81 MMHG

## 2020-11-30 DIAGNOSIS — M25.561 ARTHRALGIA OF BOTH KNEES: ICD-10-CM

## 2020-11-30 DIAGNOSIS — E11.65 TYPE 2 DIABETES MELLITUS WITH HYPERGLYCEMIA, WITHOUT LONG-TERM CURRENT USE OF INSULIN (H): Primary | ICD-10-CM

## 2020-11-30 DIAGNOSIS — M25.562 ARTHRALGIA OF BOTH KNEES: ICD-10-CM

## 2020-11-30 DIAGNOSIS — I10 ESSENTIAL HYPERTENSION WITH GOAL BLOOD PRESSURE LESS THAN 140/90: ICD-10-CM

## 2020-11-30 DIAGNOSIS — Z23 NEED FOR PROPHYLACTIC VACCINATION AND INOCULATION AGAINST INFLUENZA: ICD-10-CM

## 2020-11-30 DIAGNOSIS — E78.5 HYPERLIPIDEMIA LDL GOAL <100: ICD-10-CM

## 2020-11-30 LAB
CREAT UR-MCNC: 129 MG/DL
HBA1C MFR BLD: 9.8 % (ref 0–5.6)
MICROALBUMIN UR-MCNC: 40 MG/L
MICROALBUMIN/CREAT UR: 30.93 MG/G CR (ref 0–25)

## 2020-11-30 PROCEDURE — 90682 RIV4 VACC RECOMBINANT DNA IM: CPT | Performed by: INTERNAL MEDICINE

## 2020-11-30 PROCEDURE — 99214 OFFICE O/P EST MOD 30 MIN: CPT | Mod: 25 | Performed by: INTERNAL MEDICINE

## 2020-11-30 PROCEDURE — 83036 HEMOGLOBIN GLYCOSYLATED A1C: CPT | Performed by: INTERNAL MEDICINE

## 2020-11-30 PROCEDURE — 82043 UR ALBUMIN QUANTITATIVE: CPT | Performed by: INTERNAL MEDICINE

## 2020-11-30 PROCEDURE — 99207 PR FOOT EXAM NO CHARGE: CPT | Performed by: INTERNAL MEDICINE

## 2020-11-30 PROCEDURE — 90471 IMMUNIZATION ADMIN: CPT | Performed by: INTERNAL MEDICINE

## 2020-11-30 PROCEDURE — 36415 COLL VENOUS BLD VENIPUNCTURE: CPT | Performed by: INTERNAL MEDICINE

## 2020-11-30 RX ORDER — GLYBURIDE-METFORMIN HYDROCHLORIDE 5; 500 MG/1; MG/1
TABLET ORAL
Qty: 120 TABLET | Refills: 3 | Status: SHIPPED | OUTPATIENT
Start: 2020-11-30 | End: 2021-03-15

## 2020-11-30 RX ORDER — PIOGLITAZONEHYDROCHLORIDE 15 MG/1
15 TABLET ORAL DAILY
Qty: 90 TABLET | Refills: 1 | Status: SHIPPED | OUTPATIENT
Start: 2020-11-30 | End: 2021-03-15

## 2020-11-30 RX ORDER — LISINOPRIL 20 MG/1
20 TABLET ORAL DAILY
Qty: 90 TABLET | Refills: 1 | Status: SHIPPED | OUTPATIENT
Start: 2020-11-30 | End: 2021-03-15

## 2020-11-30 RX ORDER — ATORVASTATIN CALCIUM 40 MG/1
40 TABLET, FILM COATED ORAL DAILY
Qty: 90 TABLET | Refills: 1 | Status: SHIPPED | OUTPATIENT
Start: 2020-11-30 | End: 2021-03-15

## 2020-11-30 ASSESSMENT — MIFFLIN-ST. JEOR: SCORE: 1112

## 2020-11-30 NOTE — PROGRESS NOTES
Subjective     Alfa Sung is a 60 year old female who presents to clinic today for the following health issues:    HPI          Patient would like disability parking pass.     Diabetes Follow-up    How often are you checking your blood sugar? One time daily  What time of day are you checking your blood sugars (select all that apply)?  Before meals  Have you had any blood sugars above 200?  Yes sometimes   Have you had any blood sugars below 70?  No    What symptoms do you notice when your blood sugar is low?  None    What concerns do you have today about your diabetes? None     Do you have any of these symptoms? (Select all that apply)  No numbness or tingling in feet.  No redness, sores or blisters on feet.  No complaints of excessive thirst.  No reports of blurry vision.  No significant changes to weight. BUT DOES HAVE RIGHT KNEE PAIN. PATIENT HAS HAD INJECTION BEFORE.     Have you had a diabetic eye exam in the last 12 months? No                Hyperlipidemia Follow-Up      Are you regularly taking any medication or supplement to lower your cholesterol?   Yes- Atorvastatin    Are you having muscle aches or other side effects that you think could be caused by your cholesterol lowering medication?  No    Hypertension Follow-up      Do you check your blood pressure regularly outside of the clinic? No     Are you following a low salt diet? No    Are your blood pressures ever more than 140 on the top number (systolic) OR more   than 90 on the bottom number (diastolic), for example 140/90? Yes    BP Readings from Last 2 Encounters:   09/23/20 (!) 152/82   08/27/20 (!) 150/78     Hemoglobin A1C (%)   Date Value   05/22/2020 8.4 (H)   06/04/2019 9.3 (H)     LDL Cholesterol Calculated (mg/dL)   Date Value   05/22/2020 40   10/10/2018 145 (H)     LDL Cholesterol Direct (mg/dL)   Date Value   06/04/2019 125 (H)               Review of Systems   Constitutional, HEENT, cardiovascular, pulmonary, gi and gu systems are  negative, except as otherwise noted.      Objective    There were no vitals taken for this visit.  There is no height or weight on file to calculate BMI.  Physical Exam   GENERAL: healthy, alert and no distress  EYES: Eyes grossly normal to inspection, PERRL and conjunctivae and sclerae normal  HENT: ear canals and TM's normal, nose and mouth without ulcers or lesions  NECK: no adenopathy, no asymmetry, masses, or scars and thyroid normal to palpation  RESP: lungs clear to auscultation - no rales, rhonchi or wheezes  CV: regular rate and rhythm, normal S1 S2, no S3 or S4, no murmur, click or rub, no peripheral edema and peripheral pulses strong  ABDOMEN: soft, nontender, no hepatosplenomegaly, no masses and bowel sounds normal  MS: no gross musculoskeletal defects noted,   MS: No swelling of the knees . Pain on palpation of the medial joint area.Mild crepitus  SKIN: no suspicious lesions or rashes  NEURO: Normal strength and tone, mentation intact and speech normal  PSYCH: mentation appears normal, affect normal/bright            Assessment & Plan     Type 2 diabetes mellitus with hyperglycemia, without long-term current use of insulin (H)  Denies ever having any hypoglycemia  Trying to eat healthy and exercise  Foot exam normal  - HEMOGLOBIN A1C  - Albumin Random Urine Quantitative with Creat Ratio  - glyBURIDE-metFORMIN (GLUCOVANCE) 5-500 MG tablet; TAKE 2 TABLETS BY MOUTH TWICE DAILY WITH MEALS  - pioglitazone (ACTOS) 15 MG tablet; Take 1 tablet (15 mg) by mouth daily  - FOOT EXAM    Essential hypertension with goal blood pressure less than 140/90  Has not been taking her medications for the last few days because she ran out of it  Blood pressure high today  However in the past blood pressures have been elevated  Increase lisinopril 20 mg daily  - lisinopril (ZESTRIL) 20 MG tablet; Take 1 tablet (20 mg) by mouth daily    Need for prophylactic vaccination and inoculation against influenza  - INFLUENZA QUAD,  RECOMBINANT, P-FREE (RIV4) (FLUBLOCK) [20824]    Hyperlipidemia LDL goal <100  We will repeat lipid panel next year  - atorvastatin (LIPITOR) 40 MG tablet; Take 1 tablet (40 mg) by mouth daily For cholesterol.    Arthralgia of both knees  Mild arthralgia  Recent x-rays reviewed  No gross osteoarthritis however there is some medial compartment joint narrowing  Discussed conservative measures    She wants a parking permit  I gave the relevant paperwork for her to complete and drop it for me              Return in about 6 months (around 5/30/2021).    Sanjeev Shah MD  [facility]

## 2020-12-01 ENCOUNTER — TELEPHONE (OUTPATIENT)
Dept: FAMILY MEDICINE | Facility: CLINIC | Age: 60
End: 2020-12-01

## 2020-12-01 NOTE — TELEPHONE ENCOUNTER
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 12/01/2020  Is there a due date?  (7-10 business day to compete forms)   How would you like to receive these forms? Patient will  at the clinic when completed  Which clinic [facility] the [facility] off [facility]    What is the best number to contact you? Home or Cell [phone]  What time works best to contact you with in 4 hrs? any  Is it okay to leave a message? Yes    Elliot Brand

## 2021-02-24 ENCOUNTER — TELEPHONE (OUTPATIENT)
Dept: FAMILY MEDICINE | Facility: CLINIC | Age: 61
End: 2021-02-24

## 2021-02-24 NOTE — TELEPHONE ENCOUNTER
Diabetes Education Scheduling Outreach #1:    Call to patient to schedule. Left message with phone number to call to schedule.    Plan for 2nd outreach attempt within 1 week.    Viviana Harper  [place] [facility]  Diabetes and Nutrition Scheduling

## 2021-03-11 NOTE — TELEPHONE ENCOUNTER
Diabetes Education Scheduling Outreach #2:    Call to patient to schedule. Left message with phone number to call to schedule.    Viviana Harper  [place] [facility]  Diabetes and Nutrition Scheduling

## 2021-03-15 ENCOUNTER — OFFICE VISIT (OUTPATIENT)
Dept: FAMILY MEDICINE | Facility: CLINIC | Age: 61
End: 2021-03-15
Payer: COMMERCIAL

## 2021-03-15 VITALS
DIASTOLIC BLOOD PRESSURE: 62 MMHG | OXYGEN SATURATION: 100 % | TEMPERATURE: 98 F | SYSTOLIC BLOOD PRESSURE: 132 MMHG | BODY MASS INDEX: 27.73 KG/M2 | WEIGHT: 142 LBS | HEART RATE: 89 BPM

## 2021-03-15 DIAGNOSIS — I10 ESSENTIAL HYPERTENSION WITH GOAL BLOOD PRESSURE LESS THAN 140/90: ICD-10-CM

## 2021-03-15 DIAGNOSIS — G89.29 CHRONIC PAIN OF RIGHT KNEE: Primary | ICD-10-CM

## 2021-03-15 DIAGNOSIS — E11.65 TYPE 2 DIABETES MELLITUS WITH HYPERGLYCEMIA, WITHOUT LONG-TERM CURRENT USE OF INSULIN (H): ICD-10-CM

## 2021-03-15 DIAGNOSIS — E78.5 HYPERLIPIDEMIA LDL GOAL <100: ICD-10-CM

## 2021-03-15 DIAGNOSIS — Z12.11 ENCOUNTER FOR FIT (FECAL IMMUNOCHEMICAL TEST) SCREENING: ICD-10-CM

## 2021-03-15 DIAGNOSIS — M25.561 CHRONIC PAIN OF RIGHT KNEE: Primary | ICD-10-CM

## 2021-03-15 PROCEDURE — 99214 OFFICE O/P EST MOD 30 MIN: CPT | Performed by: NURSE PRACTITIONER

## 2021-03-15 RX ORDER — LISINOPRIL 20 MG/1
20 TABLET ORAL DAILY
Qty: 90 TABLET | Refills: 1 | Status: SHIPPED | OUTPATIENT
Start: 2021-03-15 | End: 2021-06-02

## 2021-03-15 RX ORDER — PIOGLITAZONEHYDROCHLORIDE 15 MG/1
15 TABLET ORAL DAILY
Qty: 90 TABLET | Refills: 1 | Status: SHIPPED | OUTPATIENT
Start: 2021-03-15 | End: 2021-07-20

## 2021-03-15 RX ORDER — GLYBURIDE-METFORMIN HYDROCHLORIDE 5; 500 MG/1; MG/1
TABLET ORAL
Qty: 120 TABLET | Refills: 3 | Status: SHIPPED | OUTPATIENT
Start: 2021-03-15 | End: 2021-07-20

## 2021-03-15 RX ORDER — ATORVASTATIN CALCIUM 40 MG/1
40 TABLET, FILM COATED ORAL DAILY
Qty: 90 TABLET | Refills: 1 | Status: SHIPPED | OUTPATIENT
Start: 2021-03-15 | End: 2021-07-20

## 2021-03-15 NOTE — PROGRESS NOTES
Assessment & Plan     Chronic pain of right knee  I recommended that she follow up with Dr. Hawk. Ok to take tylenol for pain, ice, etc.  - Orthopedic & Spine  Referral; Future    Hyperlipidemia LDL goal <100  Needs fasting labs. She will make lab only appointment later this week.   - atorvastatin (LIPITOR) 40 MG tablet; Take 1 tablet (40 mg) by mouth daily For cholesterol.  - Lipid panel reflex to direct LDL Fasting; Future    Type 2 diabetes mellitus with hyperglycemia, without long-term current use of insulin (H)  Needs labs. She will make lab only appointment later this week. She has previously been well controlled with the regimen below. Refilled.   - glyBURIDE-metFORMIN (GLUCOVANCE) 5-500 MG tablet; TAKE 2 TABLETS BY MOUTH TWICE DAILY WITH MEALS  - pioglitazone (ACTOS) 15 MG tablet; Take 1 tablet (15 mg) by mouth daily  - Hemoglobin A1c; Future  - Comprehensive metabolic panel (BMP + Alb, Alk Phos, ALT, AST, Total. Bili, TP); Future    Essential hypertension with goal blood pressure less than 140/90  Well controlled. Due for labs. Refilled.   - lisinopril (ZESTRIL) 20 MG tablet; Take 1 tablet (20 mg) by mouth daily  - Comprehensive metabolic panel (BMP + Alb, Alk Phos, ALT, AST, Total. Bili, TP); Future    Encounter for FIT (fecal immunochemical test) screening  - Fecal colorectal cancer screen (FIT); Future         BMI:   Estimated body mass index is 27.73 kg/m  as calculated from the following:    Height as of 11/30/20: 1.524 m (5').    Weight as of this encounter: 64.4 kg (142 lb).       See Patient Instructions    Return in about 3 months (around 6/15/2021) for Physical Exam.   Due for physical with pap.    The benefits, risks and potential side effects were discussed in detail. Black box warnings discussed as relevant. All patient questions were answered. The patient was instructed to follow up immediately if any adverse reactions develop.    Return precautions discussed, including when to seek  urgent/emergent care.    Patient verbalizes understanding and agrees with plan of care. Patient stable for discharge.      ISSAC Rodriguez CNP  [facility]    Flower Grimm is a 60 year old who presents for the following health issues     HPI     Musculoskeletal problem/pain  Onset/Duration: 4-5- months   Description  Location: knee - right  Joint Swelling: YES  Redness: no  Pain: YES  Warmth: no  Intensity:  moderate  Progression of Symptoms:  worsening  Accompanying signs and symptoms:   Fevers: no  Numbness/tingling/weakness: no  History  Trauma to the area: no  Recent illness:  no  Previous similar problem: no  Previous evaluation:  YES-saw Ortho.  Precipitating or alleviating factors:  Aggravating factors include: walking and exercise  Therapies tried and outcome: Steroid injection-9-    Previously saw ortho Sept 2020  Feels heavy and painful  Pain is to right lateral joint line  Per ortho's note, if no improvement, proceed with additional x-rays and possibly MRI    Also needs refills of medications for chronic disease management. She is not fasting today. She would prefer to return to do fasting labs    No chest pain, shortness of breath, headaches, dizziness, palpitations, headaches. No polyuria, polysdipsia or polyphagia    Phone  used for entire visit.    Review of Systems   Constitutional, HEENT, cardiovascular, pulmonary, GI, , musculoskeletal, neuro, skin, endocrine and psych systems are negative, except as otherwise noted.      Objective    /62 (BP Location: Right arm, Patient Position: Right side, Cuff Size: Adult Regular)   Pulse 89   Temp 98  F (36.7  C) (Tympanic)   Wt 64.4 kg (142 lb)   LMP  (LMP Unknown)   SpO2 100%   BMI 27.73 kg/m    Body mass index is 27.73 kg/m .  Physical Exam   GENERAL: healthy, alert and no distress  EYES: Eyes grossly normal to inspection, PERRL and conjunctivae and sclerae normal  HENT: ear canals and TM's  normal, nose and mouth without ulcers or lesions  NECK: no adenopathy, no asymmetry, masses, or scars and thyroid normal to palpation  RESP: lungs clear to auscultation - no rales, rhonchi or wheezes  CV: regular rate and rhythm, normal S1 S2, no S3 or S4, no murmur, click or rub, no peripheral edema and peripheral pulses strong  ABDOMEN: soft, nontender, no hepatosplenomegaly, no masses and bowel sounds normal  MS: no gross musculoskeletal defects noted, no edema. Tenderness to right lateral joint line of knee  SKIN: no suspicious lesions or rashes  NEURO: Normal strength and tone, mentation intact and speech normal  PSYCH: mentation appears normal, affect normal/bright

## 2021-03-15 NOTE — PATIENT INSTRUCTIONS
Schedule fasting labs  Follow up with orthopedics for knee pain  Schedule physical with pap    We are working hard to begin vaccinating more people against COVID-19. Currently, we are vaccinating:    Individuals age 65 and older.    Phase 1a healthcare workers, both paid and unpaid, who are unable to do their job remotely.     People age 16 years and older with certain conditions or disabilities described in Phase 1b tier 2.      If you fit into one of these categories, please log in to [employer] using this link to see if we have an open appointment and schedule an appointment.  Most new appointments are released on Tuesdays at 8 a.m. This is when appointment availability is best. Additional appointments may open up during the week as appointments are canceled or we receive additional vaccine.    If there are no appointments left, you will be unable to schedule.   If you have technical difficulty using [employer], call [phone] for assistance.      You can learn more about the [facility]'s phased approach to administering the vaccine, with details on each phase,?[email or web address].     As vaccine supply increases and we are able to open appointments to more groups, we will share that information on our website [email or web address]. Check this website to stay up to date on COVID-19 vaccination information.    Patient Education    At [facility], we strive to deliver an exceptional experience to you, every time we see you. If you receive a survey, please complete it as we do value your feedback.  If you have MyChart, you can expect to receive results automatically within 24 hours of their completion.  Your provider will send a note interpreting your results as well.   If you do not have MyChart, you should receive your results in about a week by mail.    Your care team:                            Family Medicine  Internal Medicine   MD Lenard Mera MD Shantel Branch-Fleming, MD Srinivasa Vaka, MD Katya Belousova, PARONNIE Allred, APRJAIME Coelho, MD Pediatrics   Surinder Wilde, SERGEY Abarca, MD Erica Wright APRN CNP   MD Aline Bailey MD Deborah Mielke, MD Kim Thein, APRN [facility]      Clinic hours: Monday - Thursday 7 am-6 pm; Fridays 7 am-5 pm.   Urgent care: Monday - Friday 11 am-9 pm; Saturday and Sunday 9 am-5 pm.    Clinic: [phone]       [facility]: Monday - Thursday 8 am - 7 pm; Friday 8 am - 6 pm  [facility]: [phone]     Use www.[email or web address] for 24/7 diagnosis and treatment of dozens of conditions.

## 2021-03-17 ENCOUNTER — OFFICE VISIT (OUTPATIENT)
Dept: ORTHOPEDICS | Facility: CLINIC | Age: 61
End: 2021-03-17
Attending: NURSE PRACTITIONER
Payer: COMMERCIAL

## 2021-03-17 VITALS
HEIGHT: 60 IN | SYSTOLIC BLOOD PRESSURE: 149 MMHG | DIASTOLIC BLOOD PRESSURE: 79 MMHG | WEIGHT: 142 LBS | OXYGEN SATURATION: 96 % | HEART RATE: 96 BPM | BODY MASS INDEX: 27.88 KG/M2

## 2021-03-17 DIAGNOSIS — M17.11 PRIMARY OSTEOARTHRITIS OF RIGHT KNEE: Primary | ICD-10-CM

## 2021-03-17 DIAGNOSIS — M25.561 CHRONIC PAIN OF RIGHT KNEE: ICD-10-CM

## 2021-03-17 DIAGNOSIS — M23.41 LOOSE BODY OF RIGHT KNEE: ICD-10-CM

## 2021-03-17 DIAGNOSIS — G89.29 CHRONIC PAIN OF RIGHT KNEE: ICD-10-CM

## 2021-03-17 DIAGNOSIS — S83.281D TEAR OF LATERAL MENISCUS OF RIGHT KNEE, UNSPECIFIED TEAR TYPE, UNSPECIFIED WHETHER OLD OR CURRENT TEAR, SUBSEQUENT ENCOUNTER: ICD-10-CM

## 2021-03-17 PROCEDURE — 99214 OFFICE O/P EST MOD 30 MIN: CPT | Performed by: ORTHOPAEDIC SURGERY

## 2021-03-17 ASSESSMENT — PAIN SCALES - GENERAL: PAINLEVEL: WORST PAIN (10)

## 2021-03-17 ASSESSMENT — MIFFLIN-ST. JEOR: SCORE: 1125.61

## 2021-03-17 NOTE — LETTER
3/17/2021         RE: Alfa Sung  [address] [facility] [address]        Dear Colleague,    Thank you for referring your patient, Alfa Sung, to the [facility]. Please see a copy of my visit note below.    HISTORY OF PRESENT ILLNESS    Alfa Sung is a 60 year old female seen for follow up of right knee osteoarthritis    Injection(s) last time?: 9/23/20.  Length of effectiveness: 2 months or so..        Present symptoms:   pain medial and lateral  No swelling  Pain to walk, first getting up.  No pain flexing.  Pain medial with full extension.   Catching, locking or giving-way?: no.  Pain laterally  Stiffness.   Leg feels heavy.    KNEE EXAM:   Gait: walks with antalgic gait favoring right side  Alignment: normal       ROM: 0-130*  Effusion: mild  Tender: lateral joint line  +lateral mcmurrays.    Ligaments:  Lachman's Stable, Anterior and posterior drawer stable, stable to varus and valgus stress.  no pain with varus-valgus  stress testing.    Patellofemoral joint: mild crepitations in the patellofemoral joint.    Facet Tenderness: none    Xrays from 8/27/20: Mild narrowing and hypertrophic change in the medial  compartment. Mild hypertrophic change of the patellofemoral  compartment. Multiple subcentimeter posterior intra-articular bodies.  No knee joint effusion. Atheromatous calcification.     Impression:     ICD-10-CM    1. Primary osteoarthritis of right knee  M17.11    2. Chronic pain of right knee  M25.561 Orthopedic & Spine  Referral    G89.29 MR Knee Right w/o Contrast   3. Loose body of right knee  M23.41    4.  possible tear of lateral meniscus of right knee, unspecified tear type, unspecified whether old or current tear, subsequent encounter  S83.281D       Osteoarthritis  Possible lateral meniscus tear, possible loose bodies.    Plan:  We recommended an MRI of the knee.    Follow up to discuss findings and plan.   She would consider surgery if  indicated.    Return to clinic/My chart for results.      BETO Hawk MD  Dept. Orthopedic Surgery  [facility]        Again, thank you for allowing me to participate in the care of your patient.        Sincerely,        Amrit Hawk MD

## 2021-03-17 NOTE — PROGRESS NOTES
HISTORY OF PRESENT ILLNESS    Alfa Sung is a 60 year old female seen for follow up of right knee osteoarthritis    Injection(s) last time?: 9/23/20.  Length of effectiveness: 2 months or so..        Present symptoms:   pain medial and lateral  No swelling  Pain to walk, first getting up.  No pain flexing.  Pain medial with full extension.   Catching, locking or giving-way?: no.  Pain laterally  Stiffness.   Leg feels heavy.    KNEE EXAM:   Gait: walks with antalgic gait favoring right side  Alignment: normal       ROM: 0-130*  Effusion: mild  Tender: lateral joint line  +lateral mcmurrays.    Ligaments:  Lachman's Stable, Anterior and posterior drawer stable, stable to varus and valgus stress.  no pain with varus-valgus  stress testing.    Patellofemoral joint: mild crepitations in the patellofemoral joint.    Facet Tenderness: none    Xrays from 8/27/20: Mild narrowing and hypertrophic change in the medial  compartment. Mild hypertrophic change of the patellofemoral  compartment. Multiple subcentimeter posterior intra-articular bodies.  No knee joint effusion. Atheromatous calcification.     Impression:     ICD-10-CM    1. Primary osteoarthritis of right knee  M17.11    2. Chronic pain of right knee  M25.561 Orthopedic & Spine  Referral    G89.29 MR Knee Right w/o Contrast   3. Loose body of right knee  M23.41    4.  possible tear of lateral meniscus of right knee, unspecified tear type, unspecified whether old or current tear, subsequent encounter  S83.281D       Osteoarthritis  Possible lateral meniscus tear, possible loose bodies.    Plan:  We recommended an MRI of the knee.    Follow up to discuss findings and plan.   She would consider surgery if indicated.    Total time spent 40 minutes    Return to clinic/My chart for results.      BETO Hawk MD  Dept. Orthopedic Surgery  [facility]

## 2021-04-06 ENCOUNTER — ANCILLARY PROCEDURE (OUTPATIENT)
Dept: MRI IMAGING | Facility: CLINIC | Age: 61
End: 2021-04-06
Attending: ORTHOPAEDIC SURGERY
Payer: COMMERCIAL

## 2021-04-06 DIAGNOSIS — M25.561 CHRONIC PAIN OF RIGHT KNEE: ICD-10-CM

## 2021-04-06 DIAGNOSIS — G89.29 CHRONIC PAIN OF RIGHT KNEE: ICD-10-CM

## 2021-04-06 PROCEDURE — 73721 MRI JNT OF LWR EXTRE W/O DYE: CPT | Mod: RT | Performed by: RADIOLOGY

## 2021-04-14 ENCOUNTER — IMMUNIZATION (OUTPATIENT)
Dept: NURSING | Facility: CLINIC | Age: 61
End: 2021-04-14
Payer: COMMERCIAL

## 2021-04-14 PROCEDURE — 91300 PR COVID VAC PFIZER DIL RECON 30 MCG/0.3 ML IM: CPT

## 2021-04-14 PROCEDURE — 0001A PR COVID VAC PFIZER DIL RECON 30 MCG/0.3 ML IM: CPT

## 2021-04-25 ENCOUNTER — HEALTH MAINTENANCE LETTER (OUTPATIENT)
Age: 61
End: 2021-04-25

## 2021-05-05 ENCOUNTER — IMMUNIZATION (OUTPATIENT)
Dept: NURSING | Facility: CLINIC | Age: 61
End: 2021-05-05
Attending: FAMILY MEDICINE
Payer: COMMERCIAL

## 2021-05-05 PROCEDURE — 91300 PR COVID VAC PFIZER DIL RECON 30 MCG/0.3 ML IM: CPT

## 2021-05-05 PROCEDURE — 0002A PR COVID VAC PFIZER DIL RECON 30 MCG/0.3 ML IM: CPT

## 2021-06-19 ENCOUNTER — HEALTH MAINTENANCE LETTER (OUTPATIENT)
Age: 61
End: 2021-06-19

## 2021-07-20 ENCOUNTER — OFFICE VISIT (OUTPATIENT)
Dept: FAMILY MEDICINE | Facility: CLINIC | Age: 61
End: 2021-07-20
Payer: COMMERCIAL

## 2021-07-20 VITALS
HEART RATE: 82 BPM | DIASTOLIC BLOOD PRESSURE: 92 MMHG | OXYGEN SATURATION: 100 % | SYSTOLIC BLOOD PRESSURE: 162 MMHG | WEIGHT: 139.6 LBS | BODY MASS INDEX: 27.41 KG/M2 | HEIGHT: 60 IN | TEMPERATURE: 98.6 F

## 2021-07-20 DIAGNOSIS — E78.5 HYPERLIPIDEMIA LDL GOAL <100: ICD-10-CM

## 2021-07-20 DIAGNOSIS — E11.65 TYPE 2 DIABETES MELLITUS WITH HYPERGLYCEMIA, WITHOUT LONG-TERM CURRENT USE OF INSULIN (H): Primary | ICD-10-CM

## 2021-07-20 DIAGNOSIS — Z11.4 SCREENING FOR HIV (HUMAN IMMUNODEFICIENCY VIRUS): ICD-10-CM

## 2021-07-20 DIAGNOSIS — S83.241D TEAR OF MEDIAL MENISCUS OF RIGHT KNEE, UNSPECIFIED TEAR TYPE, UNSPECIFIED WHETHER OLD OR CURRENT TEAR, SUBSEQUENT ENCOUNTER: ICD-10-CM

## 2021-07-20 DIAGNOSIS — I10 ESSENTIAL HYPERTENSION WITH GOAL BLOOD PRESSURE LESS THAN 140/90: ICD-10-CM

## 2021-07-20 PROCEDURE — 87389 HIV-1 AG W/HIV-1&-2 AB AG IA: CPT | Performed by: PHYSICIAN ASSISTANT

## 2021-07-20 PROCEDURE — 36415 COLL VENOUS BLD VENIPUNCTURE: CPT | Performed by: PHYSICIAN ASSISTANT

## 2021-07-20 PROCEDURE — 99214 OFFICE O/P EST MOD 30 MIN: CPT | Performed by: PHYSICIAN ASSISTANT

## 2021-07-20 RX ORDER — PIOGLITAZONEHYDROCHLORIDE 15 MG/1
15 TABLET ORAL DAILY
Qty: 90 TABLET | Refills: 1 | Status: SHIPPED | OUTPATIENT
Start: 2021-07-20 | End: 2022-06-02

## 2021-07-20 RX ORDER — LISINOPRIL 20 MG/1
TABLET ORAL
Qty: 90 TABLET | Refills: 0 | Status: SHIPPED | OUTPATIENT
Start: 2021-07-20 | End: 2021-11-16

## 2021-07-20 RX ORDER — GLYBURIDE-METFORMIN HYDROCHLORIDE 5; 500 MG/1; MG/1
TABLET ORAL
Qty: 120 TABLET | Refills: 3 | Status: SHIPPED | OUTPATIENT
Start: 2021-07-20 | End: 2021-12-08

## 2021-07-20 RX ORDER — ATORVASTATIN CALCIUM 40 MG/1
40 TABLET, FILM COATED ORAL DAILY
Qty: 90 TABLET | Refills: 1 | Status: SHIPPED | OUTPATIENT
Start: 2021-07-20 | End: 2022-06-02

## 2021-07-20 ASSESSMENT — PAIN SCALES - GENERAL: PAINLEVEL: NO PAIN (0)

## 2021-07-20 ASSESSMENT — MIFFLIN-ST. JEOR: SCORE: 1114.72

## 2021-07-20 NOTE — PATIENT INSTRUCTIONS
At [facility], we strive to deliver an exceptional experience to you, every time we see you. If you receive a survey, please complete it as we do value your feedback.  If you have MyChart, you can expect to receive results automatically within 24 hours of their completion.  Your provider will send a note interpreting your results as well.   If you do not have MyChart, you should receive your results in about a week by mail.    Your care team:                            Family Medicine Internal Medicine   MD Lenard Mera MD Shantel Branch-Fleming, MD Srinivasa Vaka, MD Katya Belousova, PARONNIE Allred, APRN CNP    Emre Coelho, MD Pediatrics   Surinder Wilde, PARONNIE Abarca, CNP MD Erica Mueller APRN CNP   MD Aline Bailey MD Deborah Mielke, MD Maureen Mcdowell, APRN [facility]      Clinic hours: Monday - Thursday 7 am-6 pm; Fridays 7 am-5 pm.   Urgent care: Monday - Friday 10 am- 8 pm; Saturday and Sunday 9 am-5 pm.    Clinic: [phone]       [facility]: Monday - Thursday 8 am - 7 pm; Friday 8 am - 6 pm  [facility]: [phone]     Use [email or web address] for 24/7 diagnosis and treatment of dozens of conditions.  Patient Education     Uncontrolled High Blood Pressure (Established)    Your blood pressure was unusually high today. Your blood pressure may be high for several reasons. For example, this can occur if you ve missed doses of your blood pressure medicine. Or it can happen if you are taking other medicines such as some asthma inhalers, decongestants, diet pills, and illegal drugs like cocaine and amphetamine.   Other causes of high blood pressure include:     Weight gain    Too much salt in your diet    Smoking    Caffeine    Lack of exercise    Intense pain    Becoming upset. This means you feel fear, anger, or another strong emotion.  Blood pressure  measurements are given as 2 numbers. Systolic blood pressure is the upper number. This is the pressure when the heart contracts. Diastolic blood pressure is the lower number. This is the pressure when the heart relaxes between beats. You will see your blood pressure readings written together. For example, a person with a systolic pressure of 118 and a diastolic pressure of 78 will have 118/78 written in the medical record. To be diagnosed with high blood pressure, your numbers must be higher than the normal range when tested over a period of time.   Blood pressure is categorized as normal, elevated, or stage 1 or stage 2 high blood pressure:     Normal blood pressure is systolic of less than 120 and diastolic of less than 80 (120/80)    Elevated blood pressure is systolic of 120 to 129 and diastolic less than 80    Stage 1 high blood pressure is systolic of 130 to 139 or diastolic between 80 to 89    Stage 2 high blood pressure is when systolic is 140 or higher or the diastolic is 90 or higher  Uncontrolled high blood pressure can cause serious health problems. It raises your risk for heart attack, stroke, and heart failure. But you can do many things to manage your blood pressure. In general, if you have high blood pressure, keeping your blood pressure below 130/80 mmHg may help prevent these problems. Your healthcare provider may prescribe medicine to help control blood pressure if lifestyle changes are not enough.   Home care  It s important to take steps to lower your blood pressure. If you are taking blood pressure medicine, the guidelines below may help you need less or no medicines in the future.     Start a weight-loss program if you are overweight.    Cut back on the amount of salt in your diet:  ? Don't have high-salt foods such as olives, pickles, smoked meats, canned soups, deli meats, or salted potato chips.  ? Don t add salt to your food at the table.  ? Use only small amounts of salt when  cooking.    Start an exercise program. Talk with your healthcare provider about what exercise program is best for you. It doesn t have to be difficult. Even brisk walking for 20 minutes 3 times a week is a good form of exercise.    Don't use medicines that stimulate the heart. This includes many over-the-counter cold and sinus decongestant pills and sprays, as well as diet pills. Check the warnings about high blood pressure on the label. Before purchasing any over-the-counter medicines or supplements, always ask the pharmacist about the product's potential interaction with your high blood pressure and your medicines.    Stimulants such as amphetamine or cocaine could be lethal for someone with high blood pressure. Never take these.    Limit how much caffeine you drink. Consider switching to noncaffeinated beverages.    Stop smoking. If you are a long-time smoker, this can be hard. Enroll in a stop-smoking program to make it more likely that you will succeed. Talk with your provider about ways to quit.    Learn how to handle stress better. This is an important part of any program to lower blood pressure. Learn ways to relax. These include meditation, yoga, and biofeedback.    If medicines were prescribed, take them exactly as directed. Missing doses may cause your blood pressure to get out of control. Don't stop taking your medicines, even if you feel better or you feel like you don't need them anymore. Talk with your healthcare provider.    If you miss a dose or doses of your medicines, check with your healthcare provider or pharmacist about what to do.    Consider buying an automatic blood pressure machine. Your provider may advise a certain type. These are available at most pharmacies. It's ideal to measure your blood pressure twice a day, once in the morning, and once in the late afternoon. Try to be consistent. Check your blood pressure around the same time each day for a good comparison. Keep a written record  of your home blood pressure readings and take the record to your medical appointments.  Here are some other guidelines on home blood pressure monitoring from the American Heart Association.     Don't smoke or drink coffee for 30 minutes.    Go to the bathroom before the test.    Relax for 5 minutes before taking the measurement.    Sit correctly. Be sure your back is supported. Don't sit on a couch or soft chair. Uncross your feet and place them flat on the floor. Place your arm on a solid, flat surface like a table with the upper arm at heart level. Make certain the middle of the cuff is directly above the bend of the elbow. Check the monitor's instruction manual for an illustration.    Take multiple readings. When you measure, take 2 or 3 readings one minute apart and record all of the results.    Take your blood pressure at the same time every day, or as your healthcare provider recommends.    Record the date, time, and blood pressure reading.    Take the record with you to your next appointment. If your blood pressure monitor has a built-in memory, simply take the monitor with you to your next appointment.    Call your provider if you have several high readings. Don't be frightened by a single high reading, but if you get several high readings, check in with your healthcare provider.    Note: When blood pressure reaches a systolic (top number) of 180 or higher or a diastolic (bottom number) of 110 or higher, you need emergency medical treatment. Call your healthcare provider right away.  Follow-up care  Regular visits to your own healthcare provider for blood pressure and medicine checks are an important part of your care. Make a follow-up appointment as directed. Bring the record of your home blood pressure readings to the appointment.   When to seek medical advice  Call your healthcare provider right away if any of these occur:    Blood pressure reaches a systolic (top number) of 180 or higher or diastolic  (bottom number) of 110 or higher, emergency medical treatment is required.    Chest, arm, shoulder, neck, or upper back pain    Shortness of breath    Severe headache    Throbbing or rushing sound in the ears    Nosebleed that comes back or doesn't go away    Extreme drowsiness, confusion, or fainting    Dizziness or dizziness with spinning sensation (vertigo)    Weakness in an arm or leg or on one side of the face    Trouble speaking or seeing   [employer] last reviewed this educational content on 10/1/2019    7007-3854 [employer]. All rights reserved. This information is not intended as a substitute for professional medical care. Always follow your healthcare professional's instructions.

## 2021-07-20 NOTE — PROGRESS NOTES
{PROVIDER CHARTING PREFERENCE:921564}    Subjective   Grimm is a 60 year old who presents for the following health issues {ACCOMPANIED BY STATEMENT (Optional):154368}    HPI     {SUPERLIST (Optional):741983}  {additonal problems for provider to add (Optional):310917}    Review of Systems   {ROS COMP (Optional):303356}      Objective    LMP  (LMP Unknown)   There is no height or weight on file to calculate BMI.  Physical Exam   {Exam List (Optional):906881}    {Diagnostic Test Results (Optional):961628}    {AMBULATORY ATTESTATION (Optional):312940}

## 2021-07-20 NOTE — PROGRESS NOTES
Assessment & Plan benign exam, labs as previously ordered. Restart lisinopril , recheck at f/u as generally well controlled on this.    Problem List Items Addressed This Visit     Diabetes mellitus, type II (H) - Primary    Relevant Medications    glyBURIDE-metFORMIN (GLUCOVANCE) 5-500 MG tablet    pioglitazone (ACTOS) 15 MG tablet    Essential hypertension with goal blood pressure less than 140/90    Relevant Medications    lisinopril (ZESTRIL) 20 MG tablet    Hyperlipidemia LDL goal <100    Relevant Medications    atorvastatin (LIPITOR) 40 MG tablet    Tear of medial meniscus of right knee, unspecified tear type, unspecified whether old or current tear, subsequent encounter    Relevant Orders    Orthopedic  Referral      Other Visit Diagnoses     Screening for HIV (human immunodeficiency virus)        Relevant Orders    HIV Antigen Antibody Combo           Review of prior external note(s) from - orthopedis in the march 18 minutes spent on the date of the encounter doing chart review, history and exam, documentation and further activities per the note  {   Return in about 2 weeks (around 8/3/2021) for Specialist Follow-up, 3 months for PCP follow up.    ALMA DELIA Cobb  [facility]    Flower Grimm is a 60 year old who presents for the following health issues   HPI     Last a1c was high    Saw ortho in march, had MRI showing meniscal tears.  Hasn't had f/u since.      HTN- had run out of BP meds.       service used over the phone.     Review of Systems   MUSC- knee as above      Objective    BP (!) 162/92   Pulse 82   Temp 98.6  F (37  C) (Tympanic)   Ht 1.524 m (5')   Wt 63.3 kg (139 lb 9.6 oz)   LMP  (LMP Unknown)   SpO2 100%   Breastfeeding No   BMI 27.26 kg/m    Body mass index is 27.26 kg/m .  Physical Exam   GENERAL: healthy, alert and no distress  MS: no gross musculoskeletal defects noted, no edema  NEURO: Normal strength and  tone, mentation intact and speech normal    No results found for this or any previous visit (from the past 24 hour(s)).

## 2021-07-21 LAB — HIV 1+2 AB+HIV1 P24 AG SERPL QL IA: NONREACTIVE

## 2021-08-11 DIAGNOSIS — E11.65 TYPE 2 DIABETES MELLITUS WITH HYPERGLYCEMIA, WITHOUT LONG-TERM CURRENT USE OF INSULIN (H): ICD-10-CM

## 2021-08-11 RX ORDER — GLYBURIDE-METFORMIN HYDROCHLORIDE 5; 500 MG/1; MG/1
TABLET ORAL
Qty: 120 TABLET | Refills: 3 | Status: CANCELLED | OUTPATIENT
Start: 2021-08-11

## 2021-08-12 NOTE — TELEPHONE ENCOUNTER
Duplicate refill request for glyburide-metformin 5-500 mg.   This prescription was refilled on 7/20/21.   120 tablets + 3 refills was sent to patient's preferred pharmacy.       Beverly Ponce RN  [facility]

## 2021-10-10 ENCOUNTER — HEALTH MAINTENANCE LETTER (OUTPATIENT)
Age: 61
End: 2021-10-10

## 2021-11-16 DIAGNOSIS — I10 ESSENTIAL HYPERTENSION WITH GOAL BLOOD PRESSURE LESS THAN 140/90: ICD-10-CM

## 2021-11-16 RX ORDER — LISINOPRIL 20 MG/1
TABLET ORAL
Qty: 30 TABLET | Refills: 0 | Status: SHIPPED | OUTPATIENT
Start: 2021-11-16 | End: 2021-12-02

## 2021-11-16 NOTE — TELEPHONE ENCOUNTER
"Requested Prescriptions   Pending Prescriptions Disp Refills    lisinopril (ZESTRIL) 20 MG tablet [Pharmacy Med Name: Lisinopril 20 MG Oral Tablet] 90 tablet 0     Sig: TAKE 1 TABLET BY MOUTH ONCE DAILY .        ACE Inhibitors (Including Combos) Protocol Failed - 11/16/2021 12:28 PM        Failed - Blood pressure under 140/90 in past 12 months       BP Readings from Last 3 Encounters:   07/20/21 (!) 162/92   03/17/21 (!) 149/79   03/15/21 132/62                 Failed - Normal serum creatinine on file in past 12 months     Recent Labs   Lab Test 05/22/20  1639   CR 0.68       Ok to refill medication if creatinine is low          Failed - Normal serum potassium on file in past 12 months     Recent Labs   Lab Test 05/22/20  1639   POTASSIUM 3.7               Passed - Recent (12 mo) or future (30 days) visit within the authorizing provider's specialty     Patient has had an office visit with the authorizing provider or a provider within the authorizing providers department within the previous 12 mos or has a future within next 30 days. See \"Patient Info\" tab in inbasket, or \"Choose Columns\" in Meds & Orders section of the refill encounter.              Passed - Medication is active on med list        Passed - Patient is age 18 or older        Passed - No active pregnancy on record        Passed - No positive pregnancy test within past 12 months             "

## 2021-11-30 NOTE — PROGRESS NOTES
Assessment & Plan  DM uncontrolled , will add invokanna as shouldn' t lower fasting sugars    Parking form completed and given to patient. Copy sent to abstraction  Problem List Items Addressed This Visit     Diabetes mellitus, type II (H) - Primary    Relevant Medications    canagliflozin (INVOKANA) 100 MG tablet    Essential hypertension with goal blood pressure less than 140/90    Relevant Medications    lisinopril (ZESTRIL) 20 MG tablet      Other Visit Diagnoses     Arthralgia of both knees              30 minutes spent on the date of the encounter doing chart review, history and exam, documentation and further activities per the note  {   Return in about 3 months (around 3/2/2022) for PCP Follow-up.    ALMA DELIA Cobb  [facility] NATALIE Grimm is a 61 year old who presents for the following health issues      HPI    feels well, BS this am was 86.      Also req recert of disability parking for her chronic knee issues including rt knee meniscal injuries found earlier this year.     service used over the phone.    Review of Systems   ENDO DM as above      Objective    /86 (BP Location: Left arm, Patient Position: Chair, Cuff Size: Adult Regular)   Pulse 82   Temp 98.3  F (36.8  C) (Tympanic)   Ht 1.524 m (5')   Wt 65 kg (143 lb 4.8 oz)   LMP  (LMP Unknown)   SpO2 97%   Breastfeeding No   BMI 27.99 kg/m    Body mass index is 27.99 kg/m .  Physical Exam   GENERAL: healthy, alert and no distress  NEURO: Normal strength and tone, mentation intact and speech normal    Results for orders placed or performed in visit on 12/02/21 (from the past 24 hour(s))   Hemoglobin A1c   Result Value Ref Range    Hemoglobin A1C 8.9 (H) 0.0 - 5.6 %

## 2021-11-30 NOTE — PATIENT INSTRUCTIONS
At [facility], we strive to deliver an exceptional experience to you, every time we see you. If you receive a survey, please complete it as we do value your feedback.  If you have MyChart, you can expect to receive results automatically within 24 hours of their completion.  Your provider will send a note interpreting your results as well.   If you do not have MyChart, you should receive your results in about a week by mail.    Your care team:                            Family Medicine Internal Medicine   MD Lenard Mera MD Shantel Branch-Fleming, MD Srinivasa Vaka, MD Katya Belousova, PARONNIE Allred, APRN CNP    Emre Coelho, MD Pediatrics   Surinder Wilde, PARONNIE Abarca, CNP MD Erica Mueller APRN CNP   MD Aline Bailey MD Deborah Mielke, MD Maureen Mcdowell, APRN [facility]      Clinic hours: Monday - Thursday 7 am-6 pm; Fridays 7 am-5 pm.   Urgent care: Monday - Friday 10 am- 8 pm; Saturday and Sunday 9 am-5 pm.    Clinic: [phone]       [facility]: Monday - Thursday 8 am - 7 pm; Friday 8 am - 6 pm  [facility]: [phone]     Use [email or web address] for 24/7 diagnosis and treatment of dozens of conditions.

## 2021-12-02 ENCOUNTER — OFFICE VISIT (OUTPATIENT)
Dept: FAMILY MEDICINE | Facility: CLINIC | Age: 61
End: 2021-12-02
Payer: COMMERCIAL

## 2021-12-02 VITALS
SYSTOLIC BLOOD PRESSURE: 135 MMHG | OXYGEN SATURATION: 97 % | HEIGHT: 60 IN | DIASTOLIC BLOOD PRESSURE: 86 MMHG | BODY MASS INDEX: 28.13 KG/M2 | TEMPERATURE: 98.3 F | WEIGHT: 143.3 LBS | HEART RATE: 82 BPM

## 2021-12-02 DIAGNOSIS — E11.65 TYPE 2 DIABETES MELLITUS WITH HYPERGLYCEMIA, WITHOUT LONG-TERM CURRENT USE OF INSULIN (H): Primary | ICD-10-CM

## 2021-12-02 DIAGNOSIS — I10 ESSENTIAL HYPERTENSION WITH GOAL BLOOD PRESSURE LESS THAN 140/90: ICD-10-CM

## 2021-12-02 DIAGNOSIS — Z12.11 SPECIAL SCREENING FOR MALIGNANT NEOPLASMS, COLON: ICD-10-CM

## 2021-12-02 DIAGNOSIS — M25.561 ARTHRALGIA OF BOTH KNEES: ICD-10-CM

## 2021-12-02 DIAGNOSIS — M25.562 ARTHRALGIA OF BOTH KNEES: ICD-10-CM

## 2021-12-02 LAB
ALBUMIN SERPL-MCNC: 3.7 G/DL (ref 3.4–5)
ALP SERPL-CCNC: 48 U/L (ref 40–150)
ALT SERPL W P-5'-P-CCNC: 33 U/L (ref 0–50)
ANION GAP SERPL CALCULATED.3IONS-SCNC: 2 MMOL/L (ref 3–14)
AST SERPL W P-5'-P-CCNC: 15 U/L (ref 0–45)
BILIRUB SERPL-MCNC: 0.3 MG/DL (ref 0.2–1.3)
BUN SERPL-MCNC: 20 MG/DL (ref 7–30)
CALCIUM SERPL-MCNC: 9.6 MG/DL (ref 8.5–10.1)
CHLORIDE BLD-SCNC: 106 MMOL/L (ref 94–109)
CHOLEST SERPL-MCNC: 97 MG/DL
CO2 SERPL-SCNC: 32 MMOL/L (ref 20–32)
CREAT SERPL-MCNC: 0.62 MG/DL (ref 0.52–1.04)
CREAT UR-MCNC: 63 MG/DL
FASTING STATUS PATIENT QL REPORTED: NO
GFR SERPL CREATININE-BSD FRML MDRD: >90 ML/MIN/1.73M2
GLUCOSE BLD-MCNC: 161 MG/DL (ref 70–99)
HBA1C MFR BLD: 8.9 % (ref 0–5.6)
HDLC SERPL-MCNC: 64 MG/DL
LDLC SERPL CALC-MCNC: 21 MG/DL
MICROALBUMIN UR-MCNC: <5 MG/L
MICROALBUMIN/CREAT UR: NORMAL MG/G{CREAT}
NONHDLC SERPL-MCNC: 33 MG/DL
POTASSIUM BLD-SCNC: 4.2 MMOL/L (ref 3.4–5.3)
PROT SERPL-MCNC: 7.2 G/DL (ref 6.8–8.8)
SODIUM SERPL-SCNC: 140 MMOL/L (ref 133–144)
TRIGL SERPL-MCNC: 62 MG/DL

## 2021-12-02 PROCEDURE — 80053 COMPREHEN METABOLIC PANEL: CPT | Performed by: PHYSICIAN ASSISTANT

## 2021-12-02 PROCEDURE — 36415 COLL VENOUS BLD VENIPUNCTURE: CPT | Performed by: PHYSICIAN ASSISTANT

## 2021-12-02 PROCEDURE — 82043 UR ALBUMIN QUANTITATIVE: CPT | Performed by: PHYSICIAN ASSISTANT

## 2021-12-02 PROCEDURE — 83036 HEMOGLOBIN GLYCOSYLATED A1C: CPT | Performed by: PHYSICIAN ASSISTANT

## 2021-12-02 PROCEDURE — 99214 OFFICE O/P EST MOD 30 MIN: CPT | Performed by: PHYSICIAN ASSISTANT

## 2021-12-02 PROCEDURE — 80061 LIPID PANEL: CPT | Performed by: PHYSICIAN ASSISTANT

## 2021-12-02 RX ORDER — LISINOPRIL 20 MG/1
20 TABLET ORAL DAILY
Qty: 90 TABLET | Refills: 0 | Status: SHIPPED | OUTPATIENT
Start: 2021-12-02 | End: 2022-01-27

## 2021-12-02 ASSESSMENT — PAIN SCALES - GENERAL: PAINLEVEL: MILD PAIN (2)

## 2021-12-02 ASSESSMENT — MIFFLIN-ST. JEOR: SCORE: 1126.5

## 2021-12-04 ENCOUNTER — HEALTH MAINTENANCE LETTER (OUTPATIENT)
Age: 61
End: 2021-12-04

## 2021-12-20 DIAGNOSIS — E11.65 TYPE 2 DIABETES MELLITUS WITH HYPERGLYCEMIA, WITHOUT LONG-TERM CURRENT USE OF INSULIN (H): ICD-10-CM

## 2021-12-21 RX ORDER — GLYBURIDE-METFORMIN HYDROCHLORIDE 5; 500 MG/1; MG/1
TABLET ORAL
Qty: 360 TABLET | Refills: 0 | Status: SHIPPED | OUTPATIENT
Start: 2021-12-21 | End: 2022-05-12

## 2021-12-22 ENCOUNTER — APPOINTMENT (OUTPATIENT)
Dept: INTERPRETER SERVICES | Facility: CLINIC | Age: 61
End: 2021-12-22
Payer: COMMERCIAL

## 2021-12-24 ENCOUNTER — APPOINTMENT (OUTPATIENT)
Dept: INTERPRETER SERVICES | Facility: CLINIC | Age: 61
End: 2021-12-24
Payer: COMMERCIAL

## 2022-01-11 ENCOUNTER — OFFICE VISIT (OUTPATIENT)
Dept: OPTOMETRY | Facility: CLINIC | Age: 62
End: 2022-01-11
Payer: COMMERCIAL

## 2022-01-11 DIAGNOSIS — H52.223 REGULAR ASTIGMATISM OF BOTH EYES: ICD-10-CM

## 2022-01-11 DIAGNOSIS — Z01.00 EXAMINATION OF EYES AND VISION: Primary | ICD-10-CM

## 2022-01-11 DIAGNOSIS — H52.4 PRESBYOPIA: ICD-10-CM

## 2022-01-11 DIAGNOSIS — Z96.1 PSEUDOPHAKIA: ICD-10-CM

## 2022-01-11 DIAGNOSIS — E11.3213 TYPE 2 DIABETES MELLITUS WITH BOTH EYES AFFECTED BY MILD NONPROLIFERATIVE RETINOPATHY AND MACULAR EDEMA, WITHOUT LONG-TERM CURRENT USE OF INSULIN (H): ICD-10-CM

## 2022-01-11 DIAGNOSIS — H11.001 PTERYGIUM EYE, RIGHT: ICD-10-CM

## 2022-01-11 DIAGNOSIS — R68.89 SUSPECTED GLAUCOMA OF BOTH EYES: ICD-10-CM

## 2022-01-11 PROCEDURE — 92004 COMPRE OPH EXAM NEW PT 1/>: CPT | Performed by: OPTOMETRIST

## 2022-01-11 PROCEDURE — 92015 DETERMINE REFRACTIVE STATE: CPT | Performed by: OPTOMETRIST

## 2022-01-11 ASSESSMENT — KERATOMETRY
OS_AXISANGLE2_DEGREES: 179
OS_K2POWER_DIOPTERS: 46.00
OD_AXISANGLE_DEGREES: 100
OS_AXISANGLE_DEGREES: 89
OS_K1POWER_DIOPTERS: 41.50
OD_K2POWER_DIOPTERS: 53.75
OD_AXISANGLE2_DEGREES: 10
METHOD_AUTO_MANUAL: AUTOMATED
OD_K1POWER_DIOPTERS: 47.25

## 2022-01-11 ASSESSMENT — TONOMETRY
OD_IOP_MMHG: 22
IOP_METHOD: TONOPEN
OS_IOP_MMHG: 21

## 2022-01-11 ASSESSMENT — REFRACTION_WEARINGRX
OS_ADD: +2.50
SPECS_TYPE: PAL
OS_AXIS: 088
OD_SPHERE: -4.25
OS_CYLINDER: +4.50
OD_AXIS: 100
OD_ADD: +2.50
OD_CYLINDER: +5.00
OS_SPHERE: -4.00

## 2022-01-11 ASSESSMENT — CONF VISUAL FIELD
OS_NORMAL: 1
OD_NORMAL: 1

## 2022-01-11 ASSESSMENT — REFRACTION_MANIFEST
OS_CYLINDER: +4.50
OD_CYLINDER: +5.00
OD_SPHERE: -4.25
OD_AXIS: 100
OS_ADD: +2.50
OD_ADD: +2.50
OS_SPHERE: -4.00
OS_AXIS: 088

## 2022-01-11 ASSESSMENT — VISUAL ACUITY
OS_CC: 20/20
OD_CC: 20/40
OS_SC+: -1
OD_PH_SC: 20/40
OS_PH_SC: 20/40
METHOD: SNELLEN - LINEAR
CORRECTION_TYPE: GLASSES
OS_SC: 20/70
OD_SC: 20/100

## 2022-01-11 ASSESSMENT — EXTERNAL EXAM - LEFT EYE: OS_EXAM: NORMAL

## 2022-01-11 ASSESSMENT — SLIT LAMP EXAM - LIDS
COMMENTS: PERMANENT EYELINER
COMMENTS: PERMANENT EYELINER

## 2022-01-11 ASSESSMENT — EXTERNAL EXAM - RIGHT EYE: OD_EXAM: NORMAL

## 2022-01-11 ASSESSMENT — CUP TO DISC RATIO
OD_RATIO: 0.3
OS_RATIO: 0.45

## 2022-01-11 NOTE — PROGRESS NOTES
Chief Complaint   Patient presents with     Annual Eye Exam        Hemoglobin A1C POCT   Date Value Ref Range Status   11/30/2020 9.8 (H) 0 - 5.6 % Final     Comment:     Normal <5.7% Prediabetes 5.7-6.4%  Diabetes 6.5% or higher - adopted from ADA   consensus guidelines.       Hemoglobin A1C   Date Value Ref Range Status   12/02/2021 8.9 (H) 0.0 - 5.6 % Final     Comment:     Normal <5.7%   Prediabetes 5.7-6.4%    Diabetes 6.5% or higher     Note: Adopted from ADA consensus guidelines.       Accompanied by  on Ipad      Last Eye Exam: 05/16/2018  Dilated Previously: Yes    What are you currently using to see?  Glasses only when reading but it is very blurry       Distance Vision Acuity: Noticed gradual change in both eyes    Near Vision Acuity: Not satisfied     Eye Comfort: good  Do you use eye drops? : Yes: Clear Eyes Redness Relief  Occupation or Hobbies: Electronics    Has been seen in the past by Dr. Guerra for pterygium surgery in 2018 which was cancelled.  She is not interested in evaluation at this time- maybe when she is 63 and not working.    Lazaro Rahman - Optometric Assistant          Medical, surgical and family histories reviewed and updated 1/11/2022.       OBJECTIVE: See Ophthalmology exam    ASSESSMENT:    ICD-10-CM    1. Examination of eyes and vision  Z01.00 EYE EXAM (SIMPLE-NONBILLABLE)   2. Type 2 diabetes mellitus with both eyes affected by mild nonproliferative retinopathy and macular edema, without long-term current use of insulin (H)  E11.3213    3. Pterygium eye, right  H11.001 EYE EXAM (SIMPLE-NONBILLABLE)   4. Pseudophakia  Z96.1 EYE EXAM (SIMPLE-NONBILLABLE)   5. Suspected glaucoma of both eyes  H40.003     Secondary to appearance of optic nerve- Asymmetry os > od  Borderline IOPs, range in low 20's.   6. Presbyopia  H52.4 REFRACTION   7. Regular astigmatism of both eyes  H52.223 REFRACTION      PLAN:     Patient Instructions    You have diabetic retinopathy which is a leakage  of blood vessels of the retina.  It is important to keep good control of your diabetes at this time.    Referral to the [facility] Retina department for evaluation.    OTC Lumify as needed to take the redness out.    Systane or other artificial tear 1 drop both eyes 2-4 x day.    Eyeglass prescription given.    Return in 1 year for a complete eye exam or sooner if needed.    Abraham Miranda, OD

## 2022-01-11 NOTE — LETTER
1/11/2022         RE: Alfa Sung  [address] [address] [address]        Dear Colleague,    Thank you for referring your patient, Alfa Sung, to the [facility]. Please see a copy of my visit note below.    Chief Complaint   Patient presents with     Annual Eye Exam        Hemoglobin A1C POCT   Date Value Ref Range Status   11/30/2020 9.8 (H) 0 - 5.6 % Final     Comment:     Normal <5.7% Prediabetes 5.7-6.4%  Diabetes 6.5% or higher - adopted from ADA   consensus guidelines.       Hemoglobin A1C   Date Value Ref Range Status   12/02/2021 8.9 (H) 0.0 - 5.6 % Final     Comment:     Normal <5.7%   Prediabetes 5.7-6.4%    Diabetes 6.5% or higher     Note: Adopted from ADA consensus guidelines.       Accompanied by  on Ipad      Last Eye Exam: 05/16/2018  Dilated Previously: Yes    What are you currently using to see?  Glasses only when reading but it is very blurry       Distance Vision Acuity: Noticed gradual change in both eyes    Near Vision Acuity: Not satisfied     Eye Comfort: good  Do you use eye drops? : Yes: Clear Eyes Redness Relief  Occupation or Hobbies: Electronics    Has been seen in the past by Dr. Guerra for pterygium surgery in 2018 which was cancelled.  She is not interested in evaluation at this time- maybe when she is 63 and not working.    Lazaro Rahman - Optometric Assistant          Medical, surgical and family histories reviewed and updated 1/11/2022.       OBJECTIVE: See Ophthalmology exam    ASSESSMENT:    ICD-10-CM    1. Examination of eyes and vision  Z01.00 EYE EXAM (SIMPLE-NONBILLABLE)   2. Type 2 diabetes mellitus with both eyes affected by mild nonproliferative retinopathy and macular edema, without long-term current use of insulin (H)  E11.3213    3. Pterygium eye, right  H11.001 EYE EXAM (SIMPLE-NONBILLABLE)   4. Pseudophakia  Z96.1 EYE EXAM (SIMPLE-NONBILLABLE)   5. Suspected glaucoma of both eyes  H40.003     Secondary to  appearance of optic nerve- Asymmetry os > od  Borderline IOPs, range in low 20's.   6. Presbyopia  H52.4 REFRACTION   7. Regular astigmatism of both eyes  H52.223 REFRACTION      PLAN:     Patient Instructions    You have diabetic retinopathy which is a leakage of blood vessels of the retina.  It is important to keep good control of your diabetes at this time.    Referral to the [facility] Retina department for evaluation.    OTC Lumify as needed to take the redness out.    Systane or other artificial tear 1 drop both eyes 2-4 x day.    Eyeglass prescription given.    Return in 1 year for a complete eye exam or sooner if needed.    Abraham Miranda, OD                    Again, thank you for allowing me to participate in the care of your patient.        Sincerely,        Abraham Miranda, OD

## 2022-01-11 NOTE — PATIENT INSTRUCTIONS
You have diabetic retinopathy which is a leakage of blood vessels of the retina.  It is important to keep good control of your diabetes at this time.    Referral to the [facility] Retina department for evaluation.  Recommend baseline glaucoma testing also.    OTC Lumify as needed to take the redness out.    Systane or other artificial tear 1 drop both eyes 2-4 x day.    Eyeglass prescription given.    Return in 1 year for a complete eye exam or sooner if needed.    Abraham Miranda, OD    The affects of the dilating drops last for 4- 6 hours.  You will be more sensitive to light and vision will be blurry up close.  Do not drive if you do not feel comfortable.  Mydriatic sunglasses were given if needed.    Patient Education   Diabetes weakens the blood vessels all over the body, including the eyes. Damage to the blood vessels in the eyes can cause swelling or bleeding into part of the eye (called the retina). This is called diabetic retinopathy (NENO-tin-AH-puh-thee). If not treated, this disease can cause vision loss or blindness.   Symptoms may include blurred or distorted vision, but many people have no symptoms. It's important to see your eye doctor regularly to check for problems.   Early treatment and good control can help protect your vision. Here are the things you can do to help prevent vision loss:      1. Keep your blood sugar levels under tight control.      2. Bring high blood pressure under control.      3. No smoking.      4. Have yearly dilated eye exams.         Optometry Providers       Clinic Locations                                 Telephone Number   Dr. Alfreda Irizarry   [facility]  [place] [phone]     [place] Hours:                [facility] Hours:       [facility] Hours:   [address]   [address] [address]     [address] [place]  [address]   [place] [address]    [place] [address]  Phone: [phone]                    Phone: [phone]     Phone: [phone]                      Monday 8:00-6:00                          Monday 8:00-6:00                          Monday 8:00-6:00              Tuesday 8:00-6:00                          Tuesday 8:00-6:00                          Tuesday 8:00-6:00              Wednesday 8:00-6:00                  Wednesday 8:00-6:00                   Wednesday 8:00-6:00      Thursday 8:00-6:00                        Thursday 8:00-6:00                         Thursday 8:00-6:00            Friday 8:00-5:00                              Friday 8:00-5:00                              Friday 8:00-5:00    [facility] Hours:   [address] [place] [address]  [phone]    Monday 9:00-6:00  Tuesday 9:00-6:00  Wednesday 9:00-6:00  Thursday 9:00-6:00  Friday 9:00-5:00  Please log on to [email or web address] to order your contact lenses.  The link is found on the Eye Care and Vision Services page.  As always, Thank you for trusting us with your health care needs!

## 2022-01-11 NOTE — Clinical Note
Edy!!  Ms. Sung needs an appointment for retina to rule out diabetic macular edema.  She also is a glaucoma suspect so needs a work up.  Her daughter Nalini can be called to schedule the appointment -[phone].  Thank you!    Abraham Miranda, DARIN

## 2022-01-13 ENCOUNTER — TELEPHONE (OUTPATIENT)
Dept: OPHTHALMOLOGY | Facility: CLINIC | Age: 62
End: 2022-01-13
Payer: COMMERCIAL

## 2022-01-13 NOTE — TELEPHONE ENCOUNTER
Pt referred by Dr. Miranda for diabetic eye exam/rule out macular edema    Scheduled first available with Dr. De Paz on January 25th at 9 AM    Pt/cedrick aware of date/time/duration-- location details to be mailed by patient communicator and appt information also on SIPphonet.    Pt/dickson have direct number if need to make adjustments to scheduling.    Benito Ley RN 9:41 AM 01/13/22

## 2022-01-13 NOTE — TELEPHONE ENCOUNTER
Mailed out a new pt packet with time, date and a map    Nany Vazquez Communication Facilitator on 1/13/2022 at 9:48 AM

## 2022-01-19 DIAGNOSIS — E11.319 TYPE 2 DIABETES MELLITUS WITH RETINOPATHY WITHOUT MACULAR EDEMA, UNSPECIFIED LATERALITY, UNSPECIFIED RETINOPATHY SEVERITY, UNSPECIFIED WHETHER LONG TERM INSULIN USE (H): Primary | ICD-10-CM

## 2022-02-20 ENCOUNTER — TRANSFERRED RECORDS (OUTPATIENT)
Dept: HEALTH INFORMATION MANAGEMENT | Facility: CLINIC | Age: 62
End: 2022-02-20
Payer: COMMERCIAL

## 2022-02-20 LAB — PAP SMEAR - HIM PATIENT REPORTED: NEGATIVE

## 2022-03-12 ENCOUNTER — TRANSFERRED RECORDS (OUTPATIENT)
Dept: MULTI SPECIALTY CLINIC | Facility: CLINIC | Age: 62
End: 2022-03-12

## 2022-03-26 ENCOUNTER — HEALTH MAINTENANCE LETTER (OUTPATIENT)
Age: 62
End: 2022-03-26

## 2022-05-10 DIAGNOSIS — E11.65 TYPE 2 DIABETES MELLITUS WITH HYPERGLYCEMIA, WITHOUT LONG-TERM CURRENT USE OF INSULIN (H): ICD-10-CM

## 2022-05-11 ENCOUNTER — TELEPHONE (OUTPATIENT)
Dept: FAMILY MEDICINE | Facility: CLINIC | Age: 62
End: 2022-05-11
Payer: COMMERCIAL

## 2022-05-11 NOTE — TELEPHONE ENCOUNTER
"Team, please return call and gather more information. What medications are needing to be refilled? Please specify, RN team can not \"assume\" what diabetes meds patient needs at this time. Takes multiple diabetes medications.    Also, patient is overdue for follow up visit with primary care. Was supposed to return around 3/2/22 and has not done so as of yet. No future visit scheduled at this time. Needs diabetes check up. Please assist in scheduling visit for recheck.    Route back to RN team or refill pool to address refills, once indicated. Thank you.      Fabiola Chadwick RN  [facility]        "

## 2022-05-11 NOTE — TELEPHONE ENCOUNTER
Reason for Call:  Other prescription    Detailed comments: patient needing refill on diabetes medication     Phone Number Patient can be reached at: Home number on file [phone] (home)    Best Time: anytime     Can we leave a detailed message on this number? YES    Call taken on 5/11/2022 at 12:56 PM by Reece Lewis

## 2022-05-12 RX ORDER — GLYBURIDE-METFORMIN HYDROCHLORIDE 5; 500 MG/1; MG/1
TABLET ORAL
Qty: 120 TABLET | Refills: 0 | Status: SHIPPED | OUTPATIENT
Start: 2022-05-12 | End: 2022-06-03

## 2022-05-12 NOTE — TELEPHONE ENCOUNTER
Routing refill request to provider for review/approval because:  Hemoglobin A1C POCT   Date Value Ref Range Status   11/30/2020 9.8 (H) 0 - 5.6 % Final     Comment:     Normal <5.7% Prediabetes 5.7-6.4%  Diabetes 6.5% or higher - adopted from ADA   consensus guidelines.       Hemoglobin A1C   Date Value Ref Range Status   12/02/2021 8.9 (H) 0.0 - 5.6 % Final     Comment:     Normal <5.7%   Prediabetes 5.7-6.4%    Diabetes 6.5% or higher     Note: Adopted from ADA consensus guidelines.

## 2022-05-21 ENCOUNTER — HEALTH MAINTENANCE LETTER (OUTPATIENT)
Age: 62
End: 2022-05-21

## 2022-05-30 DIAGNOSIS — E78.5 HYPERLIPIDEMIA LDL GOAL <100: ICD-10-CM

## 2022-05-30 DIAGNOSIS — E11.65 TYPE 2 DIABETES MELLITUS WITH HYPERGLYCEMIA, WITHOUT LONG-TERM CURRENT USE OF INSULIN (H): ICD-10-CM

## 2022-06-01 NOTE — PROGRESS NOTES
"  Assessment & Plan       Advance care planning    Hyperlipidemia LDL goal <100    - Lipid panel reflex to direct LDL Fasting; Future  - atorvastatin (LIPITOR) 40 MG tablet; TAKE 1 TABLET BY MOUTH ONCE DAILY FOR CHOLESTEROL  - Lipid panel reflex to direct LDL Fasting    Type 2 diabetes mellitus with hyperglycemia, without long-term current use of insulin (H)    - HEMOGLOBIN A1C; Future  - blood glucose (NO BRAND SPECIFIED) test strip; Dispense item covered by pt ins. E11.9 NIDDM type II - Test 2 times/day. Reason: High A1C  - canagliflozin (INVOKANA) 100 MG tablet; Take 1 tablet (100 mg) by mouth every morning (before breakfast)  - glyBURIDE-metFORMIN (GLUCOVANCE) 5-500 MG tablet; TAKE 2 TABLETS BY MOUTH TWICE DAILY WITH MEALS  - pioglitazone (ACTOS) 15 MG tablet; Take 1 tablet (15 mg) by mouth daily  - HEMOGLOBIN A1C    Type 2 diabetes mellitus without complication, without long-term current use of insulin (H)    - dapagliflozin (FARXIGA) 5 MG TABS tablet; Take 1 tablet (5 mg) by mouth daily    Essential hypertension with goal blood pressure less than 140/90    - lisinopril (ZESTRIL) 20 MG tablet; Take 1 tablet (20 mg) by mouth daily    Special screening for malignant neoplasms, colon    - RAMA(EXACT SCIENCES)    30 minutes spent on the date of the encounter doing chart review, history and exam, documentation and further activities per the note       BMI:   Estimated body mass index is 28.02 kg/m  as calculated from the following:    Height as of this encounter: 1.514 m (4' 11.61\").    Weight as of this encounter: 64.2 kg (141 lb 9.6 oz).   Weight management plan: Discussed healthy diet and exercise guidelines    See Patient Instructions: take medication as prescribed, follow up as needed or in 6 months for recheck meds/ labs.  Schedule follow up early so you do not run out of medications.  Pt in agreement.     Return in about 6 months (around 12/3/2022), or if symptoms worsen or fail to improve.    Jennyfer " "DARELL Betancourt  [facility] DENZEL Grimm is a 61 year old who presents for the following health issues     HPI     Diabetes Follow-up    How often are you checking your blood sugar? A few times a week  What time of day are you checking your blood sugars (select all that apply)?  Before meals, After meals and At bedtime  Have you had any blood sugars above 200?  No  Have you had any blood sugars below 70?  No    What symptoms do you notice when your blood sugar is low?  None    What concerns do you have today about your diabetes? None     Do you have any of these symptoms? (Select all that apply)  No numbness or tingling in feet.  No redness, sores or blisters on feet.  No complaints of excessive thirst.  No reports of blurry vision.  No significant changes to weight.      BP Readings from Last 2 Encounters:   06/03/22 130/70   12/02/21 135/86     Hemoglobin A1C POCT (%)   Date Value   11/30/2020 9.8 (H)   05/22/2020 8.4 (H)     Hemoglobin A1C (%)   Date Value   06/03/2022 10.4 (H)   12/02/2021 8.9 (H)     LDL Cholesterol Calculated (mg/dL)   Date Value   12/02/2021 21   05/22/2020 40   10/10/2018 145 (H)     LDL Cholesterol Direct (mg/dL)   Date Value   06/04/2019 125 (H)           Review of Systems   Constitutional, HEENT, cardiovascular, pulmonary, GI, , musculoskeletal, neuro, skin, endocrine and psych systems are negative, except as otherwise noted.      Objective    /70   Pulse 88   Temp 98.3  F (36.8  C) (Tympanic)   Resp 16   Ht 1.514 m (4' 11.61\")   Wt 64.2 kg (141 lb 9.6 oz)   LMP  (LMP Unknown)   SpO2 99%   BMI 28.02 kg/m    Body mass index is 28.02 kg/m .  Physical Exam   GENERAL: healthy, alert and no distress  EYES: Eyes grossly normal to inspection, PERRL and conjunctivae and sclerae normal  RESP: lungs clear to auscultation - no rales, rhonchi or wheezes  CV: regular rate and rhythm, normal S1 S2, no S3 or S4, no murmur, click or rub, no peripheral edema and " peripheral pulses strong  MS: no gross musculoskeletal defects noted, no edema  NEURO: Normal strength and tone, mentation intact and speech normal  PSYCH: mentation appears normal, affect normal/bright  Diabetic foot exam: normal DP and PT pulses, no trophic changes or ulcerative lesions and normal sensory exam    See orders

## 2022-06-02 RX ORDER — PIOGLITAZONEHYDROCHLORIDE 15 MG/1
TABLET ORAL
Qty: 90 TABLET | Refills: 0 | Status: SHIPPED | OUTPATIENT
Start: 2022-06-02 | End: 2022-06-03

## 2022-06-02 RX ORDER — ATORVASTATIN CALCIUM 40 MG/1
TABLET, FILM COATED ORAL
Qty: 90 TABLET | Refills: 1 | Status: SHIPPED | OUTPATIENT
Start: 2022-06-02 | End: 2022-06-03

## 2022-06-03 ENCOUNTER — TELEPHONE (OUTPATIENT)
Dept: FAMILY MEDICINE | Facility: CLINIC | Age: 62
End: 2022-06-03

## 2022-06-03 ENCOUNTER — OFFICE VISIT (OUTPATIENT)
Dept: FAMILY MEDICINE | Facility: CLINIC | Age: 62
End: 2022-06-03
Payer: COMMERCIAL

## 2022-06-03 VITALS
HEIGHT: 60 IN | WEIGHT: 141.6 LBS | SYSTOLIC BLOOD PRESSURE: 130 MMHG | HEART RATE: 88 BPM | DIASTOLIC BLOOD PRESSURE: 70 MMHG | OXYGEN SATURATION: 99 % | BODY MASS INDEX: 27.8 KG/M2 | RESPIRATION RATE: 16 BRPM | TEMPERATURE: 98.3 F

## 2022-06-03 DIAGNOSIS — E78.5 HYPERLIPIDEMIA LDL GOAL <100: ICD-10-CM

## 2022-06-03 DIAGNOSIS — Z12.11 SPECIAL SCREENING FOR MALIGNANT NEOPLASMS, COLON: ICD-10-CM

## 2022-06-03 DIAGNOSIS — Z71.89 ADVANCE CARE PLANNING: Primary | ICD-10-CM

## 2022-06-03 DIAGNOSIS — E11.9 TYPE 2 DIABETES MELLITUS WITHOUT COMPLICATION, WITHOUT LONG-TERM CURRENT USE OF INSULIN (H): ICD-10-CM

## 2022-06-03 DIAGNOSIS — E11.65 TYPE 2 DIABETES MELLITUS WITH HYPERGLYCEMIA, WITHOUT LONG-TERM CURRENT USE OF INSULIN (H): ICD-10-CM

## 2022-06-03 DIAGNOSIS — Z78.9 NON-ENGLISH SPEAKING PATIENT: ICD-10-CM

## 2022-06-03 DIAGNOSIS — I10 ESSENTIAL HYPERTENSION WITH GOAL BLOOD PRESSURE LESS THAN 140/90: ICD-10-CM

## 2022-06-03 LAB
CHOLEST SERPL-MCNC: 127 MG/DL
FASTING STATUS PATIENT QL REPORTED: YES
HBA1C MFR BLD: 10.4 % (ref 0–5.6)
HDLC SERPL-MCNC: 75 MG/DL
LDLC SERPL CALC-MCNC: 33 MG/DL
NONHDLC SERPL-MCNC: 52 MG/DL
TRIGL SERPL-MCNC: 97 MG/DL

## 2022-06-03 PROCEDURE — 80061 LIPID PANEL: CPT | Performed by: NURSE PRACTITIONER

## 2022-06-03 PROCEDURE — 36415 COLL VENOUS BLD VENIPUNCTURE: CPT | Performed by: NURSE PRACTITIONER

## 2022-06-03 PROCEDURE — 99214 OFFICE O/P EST MOD 30 MIN: CPT | Performed by: NURSE PRACTITIONER

## 2022-06-03 PROCEDURE — 83036 HEMOGLOBIN GLYCOSYLATED A1C: CPT | Performed by: NURSE PRACTITIONER

## 2022-06-03 RX ORDER — DAPAGLIFLOZIN 5 MG/1
5 TABLET, FILM COATED ORAL DAILY
Qty: 90 TABLET | Refills: 1 | Status: SHIPPED | OUTPATIENT
Start: 2022-06-03 | End: 2023-02-28

## 2022-06-03 RX ORDER — PIOGLITAZONEHYDROCHLORIDE 15 MG/1
15 TABLET ORAL DAILY
Qty: 90 TABLET | Refills: 1 | Status: SHIPPED | OUTPATIENT
Start: 2022-06-03 | End: 2023-01-06

## 2022-06-03 RX ORDER — GLYBURIDE-METFORMIN HYDROCHLORIDE 5; 500 MG/1; MG/1
TABLET ORAL
Qty: 360 TABLET | Refills: 1 | Status: SHIPPED | OUTPATIENT
Start: 2022-06-03 | End: 2022-11-28

## 2022-06-03 RX ORDER — LISINOPRIL 20 MG/1
20 TABLET ORAL DAILY
Qty: 90 TABLET | Refills: 1 | Status: SHIPPED | OUTPATIENT
Start: 2022-06-03 | End: 2023-01-06

## 2022-06-03 RX ORDER — ATORVASTATIN CALCIUM 40 MG/1
TABLET, FILM COATED ORAL
Qty: 90 TABLET | Refills: 1 | Status: SHIPPED | OUTPATIENT
Start: 2022-06-03 | End: 2023-01-06

## 2022-06-03 ASSESSMENT — PAIN SCALES - GENERAL: PAINLEVEL: NO PAIN (0)

## 2022-06-03 NOTE — TELEPHONE ENCOUNTER
Prior Authorization Retail Medication Request    Medication/Dose: canagliflozin (INVOKANA) 100 MG tablet  ICD code (if different than what is on RX):  E11.65  Previously Tried and Failed:  na  Rationale:  na    Insurance Name:  [facility]  Insurance ID:  [id number]      Pharmacy Information (if different than what is on RX)  Name:  [facility]  Phone:  [phone]

## 2022-06-05 NOTE — TELEPHONE ENCOUNTER
Pharmacy comment: NDC not covered by insurance.  insurance prefers farxiga, jardiance or stelagtro

## 2022-06-06 RX ORDER — CANAGLIFLOZIN 100 MG/1
TABLET, FILM COATED ORAL
Qty: 30 TABLET | Refills: 2 | Status: SHIPPED | OUTPATIENT
Start: 2022-06-06 | End: 2023-02-28

## 2022-06-06 NOTE — TELEPHONE ENCOUNTER
"Invokana:  Provider approved Rx but \"transmission to pharmacy failed\" message received in Epic.   Rx re-sent now as \"provider sign\".      Looks like PA is in process for Invokana?    I called and spoke to pharmacist, relayed verbal order for the invokana as it did not transmit.    Advised PA is in process.    Christina Mi RN  [facility]                "

## 2022-06-08 NOTE — TELEPHONE ENCOUNTER
PA Initiation    Medication: canagliflozin (INVOKANA) 100 MG tablet PA INITIATED 6/8/22  Insurance Company: [employer] - Phone [phone] Fax [phone]  Pharmacy Filling the Rx: [facility] [address] [facility], [place] - [address] [address] [facility]  Filling Pharmacy Phone: [phone]  Filling Pharmacy Fax:    Start Date: 6/8/2022    Central Prior Authorization Team   Phone: [phone]

## 2022-06-09 DIAGNOSIS — E11.9 TYPE 2 DIABETES MELLITUS WITHOUT COMPLICATION, WITHOUT LONG-TERM CURRENT USE OF INSULIN (H): ICD-10-CM

## 2022-06-09 DIAGNOSIS — E78.5 HYPERLIPIDEMIA LDL GOAL <70: ICD-10-CM

## 2022-06-09 DIAGNOSIS — E78.5 HYPERLIPIDEMIA LDL GOAL <100: Primary | ICD-10-CM

## 2022-06-09 NOTE — RESULT ENCOUNTER NOTE
Papi Grimm,    Thank you for your recent office visit.    Here are your recent results.  Your A1C has worsened.  Please work on improving diet, and daily exercise.  Lets plan a lab recheck in 3 months to see if this has improved, otherwise we can discuss adding another diabetes medication. Follow up as needed.     Feel free to contact me via [employer] or call the clinic at [phone].    Sincerely,    ISSAC Carrera, FNP-BC

## 2022-06-10 NOTE — TELEPHONE ENCOUNTER
PRIOR AUTHORIZATION DENIED    Medication: canagliflozin (INVOKANA) 100 MG tablet PA DENIED 6/9/22    Denial Date: 6/9/2022    Denial Rational:     Appeal Information:

## 2022-06-13 DIAGNOSIS — E11.65 TYPE 2 DIABETES MELLITUS WITH HYPERGLYCEMIA, WITHOUT LONG-TERM CURRENT USE OF INSULIN (H): ICD-10-CM

## 2022-06-14 DIAGNOSIS — E11.65 TYPE 2 DIABETES MELLITUS WITH HYPERGLYCEMIA, WITHOUT LONG-TERM CURRENT USE OF INSULIN (H): ICD-10-CM

## 2022-06-14 RX ORDER — INSULIN PUMP SYRINGE, 3 ML
EACH MISCELLANEOUS
Status: CANCELLED | OUTPATIENT
Start: 2022-06-14

## 2022-06-15 ENCOUNTER — MYC MEDICAL ADVICE (OUTPATIENT)
Dept: FAMILY MEDICINE | Facility: CLINIC | Age: 62
End: 2022-06-15

## 2022-06-15 DIAGNOSIS — E11.65 TYPE 2 DIABETES MELLITUS WITH HYPERGLYCEMIA, WITHOUT LONG-TERM CURRENT USE OF INSULIN (H): Primary | ICD-10-CM

## 2022-06-17 NOTE — TELEPHONE ENCOUNTER
"Prescription approved per [facility] Refill Protocol.  Requested Prescriptions   Pending Prescriptions Disp Refills     blood glucose (NO BRAND SPECIFIED) test strip 100 strip 1     Sig: Dispense item covered by pt ins. E11.9 NIDDM type II - Test 2 times/day. Reason: High A1C       Diabetic Supplies Protocol Passed - 6/14/2022  2:05 PM        Passed - Medication is active on med list        Passed - Patient is 18 years of age or older        Passed - Recent (6 mo) or future (30 days) visit within the authorizing provider's specialty     Patient had office visit in the last 6 months or has a visit in the next 30 days with authorizing provider.  See \"Patient Info\" tab in inbasket, or \"Choose Columns\" in Meds & Orders section of the refill encounter.                 "

## 2022-06-22 NOTE — TELEPHONE ENCOUNTER
Per note received from [employer], the preferred alternatives are: Farxiga, Jardiance and Steglatro. Note to Jennyfer's in basket.

## 2022-09-18 ENCOUNTER — HEALTH MAINTENANCE LETTER (OUTPATIENT)
Age: 62
End: 2022-09-18

## 2022-11-25 DIAGNOSIS — E11.65 TYPE 2 DIABETES MELLITUS WITH HYPERGLYCEMIA, WITHOUT LONG-TERM CURRENT USE OF INSULIN (H): ICD-10-CM

## 2022-11-28 RX ORDER — GLYBURIDE-METFORMIN HYDROCHLORIDE 5; 500 MG/1; MG/1
TABLET ORAL
Qty: 360 TABLET | Refills: 0 | Status: SHIPPED | OUTPATIENT
Start: 2022-11-28 | End: 2023-02-28

## 2023-01-05 DIAGNOSIS — E78.5 HYPERLIPIDEMIA LDL GOAL <100: ICD-10-CM

## 2023-01-05 DIAGNOSIS — E11.65 TYPE 2 DIABETES MELLITUS WITH HYPERGLYCEMIA, WITHOUT LONG-TERM CURRENT USE OF INSULIN (H): ICD-10-CM

## 2023-01-05 DIAGNOSIS — I10 ESSENTIAL HYPERTENSION WITH GOAL BLOOD PRESSURE LESS THAN 140/90: ICD-10-CM

## 2023-01-06 RX ORDER — GLYBURIDE-METFORMIN HYDROCHLORIDE 5; 500 MG/1; MG/1
TABLET ORAL
Qty: 360 TABLET | Refills: 0 | OUTPATIENT
Start: 2023-01-06

## 2023-01-06 RX ORDER — LISINOPRIL 20 MG/1
TABLET ORAL
Qty: 90 TABLET | Refills: 0 | Status: SHIPPED | OUTPATIENT
Start: 2023-01-06 | End: 2023-02-28

## 2023-01-06 RX ORDER — PIOGLITAZONEHYDROCHLORIDE 15 MG/1
TABLET ORAL
Qty: 90 TABLET | Refills: 0 | Status: SHIPPED | OUTPATIENT
Start: 2023-01-06 | End: 2023-02-28

## 2023-01-06 RX ORDER — ATORVASTATIN CALCIUM 40 MG/1
TABLET, FILM COATED ORAL
Qty: 90 TABLET | Refills: 0 | Status: SHIPPED | OUTPATIENT
Start: 2023-01-06 | End: 2023-02-28

## 2023-01-06 NOTE — TELEPHONE ENCOUNTER
GlyBURIDE-metFORMIN (GLUCOVANCE) 5-500 MG tablet was last sent to requesting pharmacy for 360 tablets on 11/28/22.     Edith Dong RN BSN  [facility]

## 2023-01-23 ENCOUNTER — TELEPHONE (OUTPATIENT)
Dept: FAMILY MEDICINE | Facility: CLINIC | Age: 63
End: 2023-01-23
Payer: COMMERCIAL

## 2023-01-23 NOTE — TELEPHONE ENCOUNTER
Patient Quality Outreach    Patient is due for the following:   Diabetes -  A1C, Microalbumin and Diabetic Follow-Up Visit    Next Steps:   Patient has upcoming appointment, these items will be addressed at that time.    Type of outreach:    Chart review performed, no outreach needed.    Questions for provider review:    None     Finesse Hager

## 2023-01-28 ENCOUNTER — HEALTH MAINTENANCE LETTER (OUTPATIENT)
Age: 63
End: 2023-01-28

## 2023-02-24 NOTE — PROGRESS NOTES
Assessment & Plan     Type 2 diabetes mellitus with hyperglycemia, without long-term current use of insulin (H)    - HEMOGLOBIN A1C; Future  - Albumin Random Urine Quantitative with Creat Ratio; Future  - Adult Eye  Referral; Future  - pioglitazone (ACTOS) 15 MG tablet; Take 1 tablet (15 mg) by mouth daily  - glyBURIDE-metFORMIN (GLUCOVANCE) 5-500 MG tablet; Take 2 tablets by mouth 2 times daily (with meals)  - empagliflozin (JARDIANCE) 10 MG TABS tablet; Take 2 tablets (20 mg) by mouth daily  - dapagliflozin (FARXIGA) 10 MG TABS tablet; Take 1 tablet (10 mg) by mouth daily  - aspirin (ASA) 81 MG chewable tablet; Take 1 tablet (81 mg) by mouth daily (with dinner)  - blood glucose (NO BRAND SPECIFIED) test strip; Dispense item covered by pt ins. E11.9 NIDDM type II - Test 2 times/day. Reason: High A1C  - blood glucose (NO BRAND SPECIFIED) lancets standard; Use to test blood sugar 2 times daily or as directed.  - AMB Adult Diabetes Educator Referral; Future  - HEMOGLOBIN A1C  - Albumin Random Urine Quantitative with Creat Ratio    Screen for colon cancer    - RAMA(EXACT SCIENCES); Future    Special screening for malignant neoplasms, colon      Hyperlipidemia LDL goal <100    - ALT; Future  - atorvastatin (LIPITOR) 40 MG tablet; Take 1 tablet (40 mg) by mouth daily for cholesterol  - Lipid panel reflex to direct LDL Non-fasting; Future  - ALT  - Lipid panel reflex to direct LDL Non-fasting    Essential hypertension with goal blood pressure less than 140/90    - BASIC METABOLIC PANEL; Future  - lisinopril (ZESTRIL) 20 MG tablet; Take 1 tablet (20 mg) by mouth daily  - BASIC METABOLIC PANEL    Chronic pain of both knees    - diclofenac (VOLTAREN) 1 % topical gel; Apply 4 g topically 4 times daily    Non-English speaking patient      30 minutes spent on the date of the encounter doing chart review, history and exam, documentation and further activities per the note     BMI:   Estimated body mass index is  "27.16 kg/m  as calculated from the following:    Height as of this encounter: 1.516 m (4' 11.69\").    Weight as of this encounter: 62.4 kg (137 lb 9.6 oz).   Weight management plan: Discussed healthy diet and exercise guidelines discussed regular exercise, diet changes, weight loss will help to improve her diabetes, high cholesterol, high blood pressure.  Tips given on after visit summary.    See Patient Instructions: Take medication as prescribed asked pharmacy to put directions in Iraqi as patient can read Iraqi not English and she lives alone.  Asked pharmacy to let us know if medication is not covered as she reports she was not able to  previous medication Invokana that was too expensive; which is why she has been having higher blood sugars.  Advised patient to take medication as prescribed.  Monitor her blood sugars.  Review after visit summary tips that are in Iraqi.  Let us know if blood sugars are not improving.  Complete Cologuard colon cancer test via mail.  Use Voltaren gel for knee pain.  Follow-up as needed for healthcare questions or concerns or in 6 months.    Return in about 6 months (around 8/28/2023), or if symptoms worsen or fail to improve.    DARELL DURAN  [facility] DENZEL Grimm is a 62 year old, presenting for the following health issues:  Recheck Medication      HPI     Via  -patient would like to discuss diabetes medications and get labs if needed. Blood sugars have been high recently. Patient states she is unsure of names of the medications she is taking.  Patient reports there was no diabetes medication that was too expensive and not covered by her insurance so she never picked it up.  Chart observation makes writer think this is Invokana.  She reports the last few days she has been having blood sugars greater than 200 in the morning.  We will try to adjust her other diabetic medications and put in a note for " "pharmacy to put directions in Irish as patient lives alone and she does not read English she reads Irish.  Advised patient to monitor her blood sugars.  Directions given on after visit summary in Irish.  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  She has been having bilateral knee pain she wonders if pain is the cause of her increased blood sugars.  Discussed trying Voltaren gel for pain topically.  Discussed that she is due for a diabetic eye exam, she would like a referral.  Discussed colon cancer screening she would like Cologuard test.  Discussed blood labs due today.  She is declining vaccines.  Discussed diabetic educator referral she reports she does not have time for this, encouraged patient to schedule.  She does not check her blood pressures, she denies chest pain, dizziness, shortness of breath or headaches.  She denies skin sores.  She reports her mental health is stable.        Review of Systems   Constitutional, HEENT, cardiovascular, pulmonary, GI, , musculoskeletal, neuro, skin, endocrine and psych systems are negative, except as otherwise noted.      Objective    /60   Pulse 99   Temp 98.2  F (36.8  C) (Temporal)   Resp 16   Ht 1.516 m (4' 11.69\")   Wt 62.4 kg (137 lb 9.6 oz)   LMP  (LMP Unknown)   SpO2 99%   BMI 27.16 kg/m    Body mass index is 27.16 kg/m .  Physical Exam   GENERAL: healthy, alert and no distress  EYES: Eyes grossly normal to inspection  RESP: lungs clear to auscultation - no rales, rhonchi or wheezes  CV: regular rate and rhythm, normal S1 S2, no S3 or S4, no murmur, click or rub, no peripheral edema and peripheral pulses strong  MS: no gross musculoskeletal defects noted, no edema, no CVA tenderness  NEURO: mentation intact and speech normal  PSYCH: mentation appears normal, affect normal/bright  Diabetic foot exam: normal DP and PT pulses, no trophic changes or ulcerative lesions and normal sensory exam    See orders      "

## 2023-02-28 ENCOUNTER — OFFICE VISIT (OUTPATIENT)
Dept: FAMILY MEDICINE | Facility: CLINIC | Age: 63
End: 2023-02-28
Payer: COMMERCIAL

## 2023-02-28 ENCOUNTER — LAB (OUTPATIENT)
Dept: FAMILY MEDICINE | Facility: CLINIC | Age: 63
End: 2023-02-28

## 2023-02-28 VITALS
WEIGHT: 137.6 LBS | HEART RATE: 99 BPM | DIASTOLIC BLOOD PRESSURE: 60 MMHG | TEMPERATURE: 98.2 F | OXYGEN SATURATION: 99 % | RESPIRATION RATE: 16 BRPM | SYSTOLIC BLOOD PRESSURE: 110 MMHG | HEIGHT: 60 IN | BODY MASS INDEX: 27.01 KG/M2

## 2023-02-28 DIAGNOSIS — Z12.11 SCREEN FOR COLON CANCER: ICD-10-CM

## 2023-02-28 DIAGNOSIS — I10 ESSENTIAL HYPERTENSION WITH GOAL BLOOD PRESSURE LESS THAN 140/90: ICD-10-CM

## 2023-02-28 DIAGNOSIS — Z78.9 NON-ENGLISH SPEAKING PATIENT: ICD-10-CM

## 2023-02-28 DIAGNOSIS — G89.29 CHRONIC PAIN OF BOTH KNEES: ICD-10-CM

## 2023-02-28 DIAGNOSIS — E11.65 TYPE 2 DIABETES MELLITUS WITH HYPERGLYCEMIA, WITHOUT LONG-TERM CURRENT USE OF INSULIN (H): Primary | ICD-10-CM

## 2023-02-28 DIAGNOSIS — M25.562 CHRONIC PAIN OF BOTH KNEES: ICD-10-CM

## 2023-02-28 DIAGNOSIS — E78.5 HYPERLIPIDEMIA LDL GOAL <100: ICD-10-CM

## 2023-02-28 DIAGNOSIS — Z12.11 SPECIAL SCREENING FOR MALIGNANT NEOPLASMS, COLON: ICD-10-CM

## 2023-02-28 DIAGNOSIS — M25.561 CHRONIC PAIN OF BOTH KNEES: ICD-10-CM

## 2023-02-28 LAB — HBA1C MFR BLD: 13.9 % (ref 0–5.6)

## 2023-02-28 PROCEDURE — 36415 COLL VENOUS BLD VENIPUNCTURE: CPT | Performed by: NURSE PRACTITIONER

## 2023-02-28 PROCEDURE — 80061 LIPID PANEL: CPT | Performed by: NURSE PRACTITIONER

## 2023-02-28 PROCEDURE — 80048 BASIC METABOLIC PNL TOTAL CA: CPT | Performed by: NURSE PRACTITIONER

## 2023-02-28 PROCEDURE — 84460 ALANINE AMINO (ALT) (SGPT): CPT | Performed by: NURSE PRACTITIONER

## 2023-02-28 PROCEDURE — 82043 UR ALBUMIN QUANTITATIVE: CPT | Performed by: NURSE PRACTITIONER

## 2023-02-28 PROCEDURE — 82570 ASSAY OF URINE CREATININE: CPT | Performed by: NURSE PRACTITIONER

## 2023-02-28 PROCEDURE — 83036 HEMOGLOBIN GLYCOSYLATED A1C: CPT | Performed by: NURSE PRACTITIONER

## 2023-02-28 PROCEDURE — 99214 OFFICE O/P EST MOD 30 MIN: CPT | Performed by: NURSE PRACTITIONER

## 2023-02-28 RX ORDER — PIOGLITAZONEHYDROCHLORIDE 15 MG/1
15 TABLET ORAL DAILY
Qty: 90 TABLET | Refills: 1 | Status: SHIPPED | OUTPATIENT
Start: 2023-02-28 | End: 2023-04-12

## 2023-02-28 RX ORDER — DAPAGLIFLOZIN 10 MG/1
10 TABLET, FILM COATED ORAL DAILY
Qty: 90 TABLET | Refills: 1 | Status: SHIPPED | OUTPATIENT
Start: 2023-02-28 | End: 2023-04-12

## 2023-02-28 RX ORDER — GLYBURIDE-METFORMIN HYDROCHLORIDE 5; 500 MG/1; MG/1
2 TABLET ORAL 2 TIMES DAILY WITH MEALS
Qty: 360 TABLET | Refills: 1 | Status: SHIPPED | OUTPATIENT
Start: 2023-02-28 | End: 2023-09-14

## 2023-02-28 RX ORDER — LISINOPRIL 20 MG/1
20 TABLET ORAL DAILY
Qty: 90 TABLET | Refills: 1 | Status: SHIPPED | OUTPATIENT
Start: 2023-02-28 | End: 2023-09-14

## 2023-02-28 RX ORDER — ASPIRIN 81 MG/1
81 TABLET, CHEWABLE ORAL
Qty: 90 TABLET | Refills: 1 | Status: SHIPPED | OUTPATIENT
Start: 2023-02-28 | End: 2023-03-22

## 2023-02-28 RX ORDER — ATORVASTATIN CALCIUM 40 MG/1
40 TABLET, FILM COATED ORAL DAILY
Qty: 90 TABLET | Refills: 1 | Status: SHIPPED | OUTPATIENT
Start: 2023-02-28 | End: 2023-09-14

## 2023-02-28 ASSESSMENT — PAIN SCALES - GENERAL: PAINLEVEL: NO PAIN (0)

## 2023-03-01 LAB
ALT SERPL W P-5'-P-CCNC: 25 U/L (ref 0–50)
ANION GAP SERPL CALCULATED.3IONS-SCNC: 8 MMOL/L (ref 3–14)
BUN SERPL-MCNC: 52 MG/DL (ref 7–30)
CALCIUM SERPL-MCNC: 9.6 MG/DL (ref 8.5–10.1)
CHLORIDE BLD-SCNC: 92 MMOL/L (ref 94–109)
CHOLEST SERPL-MCNC: 122 MG/DL
CO2 SERPL-SCNC: 32 MMOL/L (ref 20–32)
CREAT SERPL-MCNC: 1.96 MG/DL (ref 0.52–1.04)
CREAT UR-MCNC: 176 MG/DL
FASTING STATUS PATIENT QL REPORTED: NORMAL
GFR SERPL CREATININE-BSD FRML MDRD: 28 ML/MIN/1.73M2
GLUCOSE BLD-MCNC: 490 MG/DL (ref 70–99)
HDLC SERPL-MCNC: 78 MG/DL
LDLC SERPL CALC-MCNC: 26 MG/DL
MICROALBUMIN UR-MCNC: 32 MG/L
MICROALBUMIN/CREAT UR: 18.18 MG/G CR (ref 0–25)
NONHDLC SERPL-MCNC: 44 MG/DL
POTASSIUM BLD-SCNC: 4.9 MMOL/L (ref 3.4–5.3)
SODIUM SERPL-SCNC: 132 MMOL/L (ref 133–144)
TRIGL SERPL-MCNC: 89 MG/DL

## 2023-03-01 NOTE — RESULT ENCOUNTER NOTE
Papi Grimm,    Thank you for your recent office visit.    Here are your recent results.  Your glucose was very high at your visit-blood sugar was 490, A1c 13.9.  Uncontrolled diabetes puts you at risk for heart attack, stroke, kidney disease, blindness, amputation.  This is most likely due to the fact that you did not previously  the noncovered diabetic medication due to cost.  We were unaware that you did not  that medication.  I did increase 2 of your diabetic medications at your visit yesterday so hopefully your glucose and A1c will be better controlled.  I think you should plan to have a follow-up lab visit in 3 months to recheck your A1c and fasting glucose.  I still suggest that you consider meeting with our diabetic educator.  Take all medications as prescribed, and monitor your blood sugars.  Your other blood labs are normal.  Please make a diabetic eye appointment, and complete your Cologuard colon cancer screening test.    Feel free to contact me via [email or web address] or call the clinic at [phone].    Sincerely,    ISSAC Carrera, FNP-BC

## 2023-03-03 ENCOUNTER — TELEPHONE (OUTPATIENT)
Dept: FAMILY MEDICINE | Facility: CLINIC | Age: 63
End: 2023-03-03

## 2023-03-03 NOTE — TELEPHONE ENCOUNTER
Diabetes Education Scheduling Outreach #1:    Call to patient to schedule. Left message with phone number to call to schedule.    Plan for 2nd outreach attempt within 1 week.    Porfirio Manriquez OnCall  Diabetes and Nutrition Scheduling

## 2023-03-22 ENCOUNTER — OFFICE VISIT (OUTPATIENT)
Dept: OPTOMETRY | Facility: CLINIC | Age: 63
End: 2023-03-22
Attending: NURSE PRACTITIONER
Payer: COMMERCIAL

## 2023-03-22 DIAGNOSIS — H52.4 PRESBYOPIA: ICD-10-CM

## 2023-03-22 DIAGNOSIS — H52.223 REGULAR ASTIGMATISM OF BOTH EYES: ICD-10-CM

## 2023-03-22 DIAGNOSIS — E11.65 TYPE 2 DIABETES MELLITUS WITH HYPERGLYCEMIA, WITHOUT LONG-TERM CURRENT USE OF INSULIN (H): Primary | ICD-10-CM

## 2023-03-22 DIAGNOSIS — H11.001 PTERYGIUM EYE, RIGHT: ICD-10-CM

## 2023-03-22 DIAGNOSIS — Z96.1 PSEUDOPHAKIA: ICD-10-CM

## 2023-03-22 DIAGNOSIS — R68.89 SUSPECTED GLAUCOMA OF BOTH EYES: ICD-10-CM

## 2023-03-22 DIAGNOSIS — E11.3313 MODERATE NONPROLIFERATIVE DIABETIC RETINOPATHY OF BOTH EYES WITH MACULAR EDEMA ASSOCIATED WITH TYPE 2 DIABETES MELLITUS (H): ICD-10-CM

## 2023-03-22 PROCEDURE — 92014 COMPRE OPH EXAM EST PT 1/>: CPT | Performed by: OPTOMETRIST

## 2023-03-22 PROCEDURE — 92015 DETERMINE REFRACTIVE STATE: CPT | Performed by: OPTOMETRIST

## 2023-03-22 ASSESSMENT — CONF VISUAL FIELD
OD_SUPERIOR_TEMPORAL_RESTRICTION: 0
OS_SUPERIOR_NASAL_RESTRICTION: 0
OD_INFERIOR_TEMPORAL_RESTRICTION: 0
OD_SUPERIOR_NASAL_RESTRICTION: 0
OS_SUPERIOR_TEMPORAL_RESTRICTION: 0
OS_INFERIOR_NASAL_RESTRICTION: 0
OD_INFERIOR_NASAL_RESTRICTION: 0
OS_INFERIOR_TEMPORAL_RESTRICTION: 0
OS_NORMAL: 1
OD_NORMAL: 1

## 2023-03-22 ASSESSMENT — REFRACTION_MANIFEST
OS_ADD: +2.50
OD_ADD: +2.50
OD_SPHERE: -4.25
OS_AXIS: 088
OD_CYLINDER: +5.00
OS_CYLINDER: +4.50
OS_SPHERE: -4.00
OD_AXIS: 100

## 2023-03-22 ASSESSMENT — VISUAL ACUITY
OD_CC: 20/150
OS_CC: 20/25-3
CORRECTION_TYPE: GLASSES
OD_SC: 20/80
METHOD: SNELLEN - LINEAR
OS_SC: 20/150
OD_CC: 20/25
OS_CC: 20/150

## 2023-03-22 ASSESSMENT — CUP TO DISC RATIO
OD_RATIO: 0.3
OS_RATIO: 0.45

## 2023-03-22 ASSESSMENT — TONOMETRY
OS_IOP_MMHG: 30
IOP_METHOD: APPLANATION
IOP_METHOD: TONOPEN
OD_IOP_MMHG: 23
OS_IOP_MMHG: 24
OD_IOP_MMHG: 29

## 2023-03-22 ASSESSMENT — REFRACTION_WEARINGRX
OS_SPHERE: -1.50
SPECS_TYPE: SVL-NEAR
OS_AXIS: 084
OD_CYLINDER: +4.25
OD_AXIS: 103
OD_SPHERE: -1.25
OS_CYLINDER: +4.50

## 2023-03-22 ASSESSMENT — SLIT LAMP EXAM - LIDS
COMMENTS: PERMANENT EYELINER
COMMENTS: PERMANENT EYELINER

## 2023-03-22 ASSESSMENT — EXTERNAL EXAM - LEFT EYE: OS_EXAM: NORMAL

## 2023-03-22 ASSESSMENT — EXTERNAL EXAM - RIGHT EYE: OD_EXAM: NORMAL

## 2023-03-22 NOTE — LETTER
3/22/2023         RE: Alfa Sung  [id number] [facility] [address]        Dear Colleague,    Thank you for referring your patient, Alfa Sung, to the [facility]. Please see a copy of my visit note below.    Chief Complaint   Patient presents with     Diabetic Eye Exam Follow Up     Accompanied by  on ipad  Chief Complaint(s) and History of Present Illness(es)     Diabetic Eye Exam Follow Up            Vision: is blurred for distance    Diabetes Type: Type 2 and taking oral medications    Duration: 10 years    Blood Sugars: is controlled               Lab Results   Component Value Date    A1C 13.9 02/28/2023    A1C 10.4 06/03/2022    A1C 8.9 12/02/2021    A1C 9.8 11/30/2020    A1C 8.4 05/22/2020    A1C 9.3 06/04/2019    A1C 10.6 10/10/2018    A1C 9.2 04/09/2018            Last Eye Exam: 1-- was referred to the retinal specialist due to diabetic retinopathy/glaucoma suspect.  Appointment was cancelled by patient.    Has been seen in the past by Dr. Geurra at the [place] [facility] for pterygium surgery in 2018 which was cancelled.  She is not interested in evaluation at this time- maybe when she is 63 and not working.       History of cataract surgery both eyes 2014.  Dilated Previously: Yes    What are you currently using to see?  glasses    Distance Vision Acuity: Noticed gradual change in both eyes    Near Vision Acuity: Satisfied with vision while reading  with glasses    Eye Comfort: good,itchy and red sometimes  Do you use eye drops? : Yes: clear eyes for redness  Occupation or Hobbies: [employer]     Meredith Stafford Optometric Assistant, A[employer]     Medical, surgical and family histories reviewed and updated 3/22/2023.       OBJECTIVE: See Ophthalmology exam    ASSESSMENT:    ICD-10-CM    1. Type 2 diabetes mellitus with hyperglycemia, without long-term current use of insulin (H)  E11.65 Adult Eye  Referral     EYE EXAM (SIMPLE-NONBILLABLE)     Adult  Eye  Referral      2. Moderate nonproliferative diabetic retinopathy of both eyes with macular edema associated with type 2 diabetes mellitus (H)  E11.3313 EYE EXAM (SIMPLE-NONBILLABLE)     Adult Eye  Referral      3. Pterygium eye, right  H11.001 EYE EXAM (SIMPLE-NONBILLABLE)      4. Pseudophakia  Z96.1 EYE EXAM (SIMPLE-NONBILLABLE)      5. Suspected glaucoma of both eyes  R68.89 EYE EXAM (SIMPLE-NONBILLABLE)     Adult Eye  Referral    Asymmetric C/Ds- LE > RE  Slightly elevated IOPs today        6. Presbyopia  H52.4 REFRACTION      7. Regular astigmatism of both eyes  H52.223 REFRACTION          PLAN:    Alfa Sung aware  eye exam results will be sent to Jennyfer Perez.  Patient Instructions   You have diabetic retinopathy which is a leakage of blood vessels of the retina.  It is important to keep good control of your diabetes at this time.    Referral to the [facility] retina clinic for retinal and glaucoma evaluation.  This is important to follow up with this visit as diabetic retinopathy and glaucoma can cause permanent vision loss.    Eyeglass prescription given.    Continue care as directed by the [facility].    Abraham Miranda, DARIN                            Again, thank you for allowing me to participate in the care of your patient.        Sincerely,        Abraham Miranda OD

## 2023-03-22 NOTE — PROGRESS NOTES
Chief Complaint   Patient presents with     Diabetic Eye Exam Follow Up     Accompanied by  on ipad  Chief Complaint(s) and History of Present Illness(es)     Diabetic Eye Exam Follow Up            Vision: is blurred for distance    Diabetes Type: Type 2 and taking oral medications    Duration: 10 years    Blood Sugars: is controlled               Lab Results   Component Value Date    A1C 13.9 02/28/2023    A1C 10.4 06/03/2022    A1C 8.9 12/02/2021    A1C 9.8 11/30/2020    A1C 8.4 05/22/2020    A1C 9.3 06/04/2019    A1C 10.6 10/10/2018    A1C 9.2 04/09/2018            Last Eye Exam: 1-- was referred to the retinal specialist due to diabetic retinopathy/glaucoma suspect.  Appointment was cancelled by patient.    Has been seen in the past by Dr. Guerra at the [place] for pterygium surgery in 2018 which was cancelled.  She is not interested in evaluation at this time- maybe when she is 63 and not working.       History of cataract surgery both eyes 2014.  Dilated Previously: Yes    What are you currently using to see?  glasses    Distance Vision Acuity: Noticed gradual change in both eyes    Near Vision Acuity: Satisfied with vision while reading  with glasses    Eye Comfort: good,itchy and red sometimes  Do you use eye drops? 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     Medical, surgical and family histories reviewed and updated 3/22/2023.       OBJECTIVE: See Ophthalmology exam    ASSESSMENT:    ICD-10-CM    1. Type 2 diabetes mellitus with hyperglycemia, without long-term current use of insulin (H)  E11.65 Adult Eye  Referral     EYE EXAM (SIMPLE-NONBILLABLE)     Adult Eye  Referral      2. Moderate nonproliferative diabetic retinopathy of both eyes with macular edema associated with type 2 diabetes mellitus (H)  E11.3313 EYE EXAM (SIMPLE-NONBILLABLE)     Adult Eye  Referral      3. Pterygium eye, right   H11.001 EYE EXAM (SIMPLE-NONBILLABLE)      4. Pseudophakia  Z96.1 EYE EXAM (SIMPLE-NONBILLABLE)      5. Suspected glaucoma of both eyes  R68.89 EYE EXAM (SIMPLE-NONBILLABLE)     Adult Eye  Referral    Asymmetric C/Ds- LE > RE  Slightly elevated IOPs today        6. Presbyopia  H52.4 REFRACTION      7. Regular astigmatism of both eyes  H52.223 REFRACTION          PLAN:    Alfa Sung aware  eye exam results will be sent to Jennyfer Perez.  Patient Instructions   You have diabetic retinopathy which is a leakage of blood vessels of the retina.  It is important to keep good control of your diabetes at this time.    Referral to the [facility] retina clinic for retinal and glaucoma evaluation.  This is important to follow up with this visit as diabetic retinopathy and glaucoma can cause permanent vision loss.    Eyeglass prescription given.    Continue care as directed by the [facility].    Abraham Miranda, DARIN

## 2023-03-22 NOTE — PATIENT INSTRUCTIONS
You have diabetic retinopathy which is a leakage of blood vessels of the retina.  It is important to keep good control of your diabetes at this time.    Referral to the [facility] retina clinic for retinal and glaucoma evaluation.  This is important to follow up with this visit as diabetic retinopathy and glaucoma can cause permanent vision loss.    Eyeglass prescription given.    Continue care as directed by the [facility].    Abraham Miranda, OD    The affects of the dilating drops last for 4- 6 hours.  You will be more sensitive to light and vision will be blurry up close.  Do not drive if you do not feel comfortable.  Mydriatic sunglasses were given if needed.    Patient Education   Diabetes weakens the blood vessels all over the body, including the eyes. Damage to the blood vessels in the eyes can cause swelling or bleeding into part of the eye (called the retina). This is called diabetic retinopathy (NENO-tin-AH-puh-thee). If not treated, this disease can cause vision loss or blindness.   Symptoms may include blurred or distorted vision, but many people have no symptoms. It's important to see your eye doctor regularly to check for problems.   Early treatment and good control can help protect your vision. Here are the things you can do to help prevent vision loss:      1. Keep your blood sugar levels under tight control.      2. Bring high blood pressure under control.      3. No smoking.      4. Have yearly dilated eye exams.         Optometry Providers       Clinic Locations                                 Telephone Number   Dr. Alfreda Juddlyn [place]  [place] [phone]     [facility] Hours:                [facility] Hours:       [facility] Hours:   [address]   [address]     [address] [place] [address]   [place] [address]     [place] [address]  Phone: [phone]                    Phone: [phone]     Phone: [phone]                      Monday 8:00-6:00                          Monday 8:00-6:00                          Monday 8:00-6:00              Tuesday 8:00-6:00                          Tuesday 8:00-6:00                          Tuesday 8:00-6:00              Wednesday 8:00-6:00                  Wednesday 8:00-6:00                   Wednesday 8:00-6:00      Thursday 8:00-6:00                        Thursday 8:00-6:00                         Thursday 8:00-6:00            Friday 8:00-5:00                              Friday 8:00-5:00                              Friday 8:00-5:00    [facility] Hours:   [address] [facility] [place] [address]  [phone]    Monday 9:00-6:00  Tuesday 9:00-6:00  Wednesday 9:00-6:00  Thursday 9:00-6:00  Friday 9:00-5:00  Please log on to [email or web address] to order your contact lenses.  The link is found on the [facility] page.  As always, Thank you for trusting us with your health care needs!

## 2023-04-03 ENCOUNTER — TELEPHONE (OUTPATIENT)
Dept: FAMILY MEDICINE | Facility: CLINIC | Age: 63
End: 2023-04-03

## 2023-04-03 DIAGNOSIS — E11.65 TYPE 2 DIABETES MELLITUS WITH HYPERGLYCEMIA, WITHOUT LONG-TERM CURRENT USE OF INSULIN (H): ICD-10-CM

## 2023-04-03 DIAGNOSIS — N18.4 CKD (CHRONIC KIDNEY DISEASE) STAGE 4, GFR 15-29 ML/MIN (H): Primary | ICD-10-CM

## 2023-04-05 NOTE — TELEPHONE ENCOUNTER
Central Prior Authorization Team   Phone: [phone]    PA Initiation    Medication: dapagliflozin (FARXIGA) 10 MG TABS tablet  Insurance Company: [employer] ([facility]) - Phone [phone] Fax [phone]  Pharmacy Filling the Rx: [facility] [address] [place], [place] - [address]  Filling Pharmacy Phone: [phone]  Filling Pharmacy Fax:    Start Date: 4/5/2023

## 2023-04-10 NOTE — TELEPHONE ENCOUNTER
PRIOR AUTHORIZATION DENIED    Medication: dapagliflozin (FARXIGA) 10 MG TABS tablet-PA DENIED     Denial Date: 4/5/2023    Denial Rational: Medication Exclusion from patients benefit plan.         Appeal Information:

## 2023-04-12 RX ORDER — PIOGLITAZONEHYDROCHLORIDE 30 MG/1
30 TABLET ORAL DAILY
Qty: 90 TABLET | Refills: 1 | Status: SHIPPED | OUTPATIENT
Start: 2023-04-12 | End: 2023-09-14

## 2023-04-12 NOTE — TELEPHONE ENCOUNTER
Patient given instructions below with a Yak[place] .  She wanted to call back at a later time to schedule the lab only appointment because she was too busy to make the appointment now.    Ritchie Flores RN

## 2023-04-12 NOTE — TELEPHONE ENCOUNTER
Please call patient back notify her that I increased her dosage of Jardiance and Actos And put in a nephrology referral to be seen for worsening kidney function.  Schedule with them as soon as possible.  She should push fluids and we should consider rechecking labs please help her make a lab appointment. ISSAC Carrera, FNP-BC

## 2023-04-12 NOTE — TELEPHONE ENCOUNTER
Spoke with patient with a Kinyarwanda , relayed providers message below and patient verbalized understanding.    Pt stated they want another diabetic medication sent at this time and they don't want to meet with a Diabetic educator.    Za Holcomb RN

## 2023-04-12 NOTE — TELEPHONE ENCOUNTER
Please let patient know that their insurance does not cover dapagliflozin (FARXIGA).  Do they want another diabetic medication sent in at this time?  Or do they want to meet with our diabetic educator?  Please let me know. ISSAC Carrera, FNP-BC

## 2023-04-13 ENCOUNTER — TELEPHONE (OUTPATIENT)
Dept: FAMILY MEDICINE | Facility: CLINIC | Age: 63
End: 2023-04-13
Payer: COMMERCIAL

## 2023-04-13 ENCOUNTER — TELEPHONE (OUTPATIENT)
Dept: NEPHROLOGY | Facility: CLINIC | Age: 63
End: 2023-04-13
Payer: COMMERCIAL

## 2023-04-13 DIAGNOSIS — I10 ESSENTIAL HYPERTENSION WITH GOAL BLOOD PRESSURE LESS THAN 140/90: ICD-10-CM

## 2023-04-13 DIAGNOSIS — N18.4 CKD (CHRONIC KIDNEY DISEASE) STAGE 4, GFR 15-29 ML/MIN (H): ICD-10-CM

## 2023-04-13 DIAGNOSIS — I10 ESSENTIAL HYPERTENSION WITH GOAL BLOOD PRESSURE LESS THAN 140/90: Primary | ICD-10-CM

## 2023-04-13 DIAGNOSIS — E78.5 HYPERLIPIDEMIA LDL GOAL <100: ICD-10-CM

## 2023-04-13 DIAGNOSIS — E11.65 UNCONTROLLED TYPE 2 DIABETES MELLITUS WITH HYPERGLYCEMIA (H): Primary | ICD-10-CM

## 2023-04-13 NOTE — TELEPHONE ENCOUNTER
[facility] Call Center    Phone Message    May a detailed message be left on voicemail: yes     Reason for Call: Order(s): Other:   Reason for requested: New Neph  Date needed: 5/9/23  Provider name: Beatriz Blank      Action Taken: Message routed to:  [facility] Neph    Travel Screening: Not Applicable

## 2023-04-13 NOTE — LETTER
April 21, 2023      Alfa Sung  [address] [facility] [address]        Dear Alfa,       We have tried multiple times to reach you but have been unsuccessful.    Please see message from Jennyfer Perez NP below.    Patient is already taking glyburide, metformin and Actos. She will need to meet with the diabetic educator to figure out what else she can take; especially since her kidney function has decreased. She does have a follow-up with nephrology in May.    Please call clinic for any questions or concerns at [phone].      Sincerely,    [facility] Team

## 2023-04-17 ENCOUNTER — LAB (OUTPATIENT)
Dept: LAB | Facility: CLINIC | Age: 63
End: 2023-04-17
Payer: COMMERCIAL

## 2023-04-17 DIAGNOSIS — E11.65 TYPE 2 DIABETES MELLITUS WITH HYPERGLYCEMIA, WITHOUT LONG-TERM CURRENT USE OF INSULIN (H): ICD-10-CM

## 2023-04-17 DIAGNOSIS — N18.4 CKD (CHRONIC KIDNEY DISEASE) STAGE 4, GFR 15-29 ML/MIN (H): ICD-10-CM

## 2023-04-17 DIAGNOSIS — E78.5 HYPERLIPIDEMIA LDL GOAL <70: ICD-10-CM

## 2023-04-17 DIAGNOSIS — E11.9 TYPE 2 DIABETES MELLITUS WITHOUT COMPLICATION, WITHOUT LONG-TERM CURRENT USE OF INSULIN (H): ICD-10-CM

## 2023-04-17 DIAGNOSIS — I10 ESSENTIAL HYPERTENSION WITH GOAL BLOOD PRESSURE LESS THAN 140/90: ICD-10-CM

## 2023-04-17 LAB
ALBUMIN MFR UR ELPH: 6.6 MG/DL (ref 1–14)
ALBUMIN UR-MCNC: NEGATIVE MG/DL
APPEARANCE UR: CLEAR
BACTERIA #/AREA URNS HPF: ABNORMAL /HPF
BILIRUB UR QL STRIP: NEGATIVE
COLOR UR AUTO: YELLOW
CREAT UR-MCNC: 32 MG/DL
ERYTHROCYTE [DISTWIDTH] IN BLOOD BY AUTOMATED COUNT: 14.6 % (ref 10–15)
GLUCOSE UR STRIP-MCNC: 500 MG/DL
HCT VFR BLD AUTO: 34.4 % (ref 35–47)
HGB BLD-MCNC: 11.1 G/DL (ref 11.7–15.7)
HGB UR QL STRIP: NEGATIVE
KETONES UR STRIP-MCNC: NEGATIVE MG/DL
LEUKOCYTE ESTERASE UR QL STRIP: ABNORMAL
MCH RBC QN AUTO: 31.3 PG (ref 26.5–33)
MCHC RBC AUTO-ENTMCNC: 32.3 G/DL (ref 31.5–36.5)
MCV RBC AUTO: 97 FL (ref 78–100)
NITRATE UR QL: NEGATIVE
PH UR STRIP: 6.5 [PH] (ref 5–7)
PLATELET # BLD AUTO: 191 10E3/UL (ref 150–450)
PROT/CREAT 24H UR: 0.21 MG/MG CR (ref 0–0.2)
PTH-INTACT SERPL-MCNC: 38 PG/ML (ref 15–65)
RBC # BLD AUTO: 3.55 10E6/UL (ref 3.8–5.2)
RBC #/AREA URNS AUTO: ABNORMAL /HPF
SP GR UR STRIP: 1.01 (ref 1–1.03)
SQUAMOUS #/AREA URNS AUTO: ABNORMAL /LPF
UROBILINOGEN UR STRIP-ACNC: 0.2 E.U./DL
WBC # BLD AUTO: 6.5 10E3/UL (ref 4–11)
WBC #/AREA URNS AUTO: ABNORMAL /HPF
WBC CLUMPS #/AREA URNS HPF: PRESENT /HPF

## 2023-04-17 PROCEDURE — 80069 RENAL FUNCTION PANEL: CPT

## 2023-04-17 PROCEDURE — 82728 ASSAY OF FERRITIN: CPT

## 2023-04-17 PROCEDURE — 83970 ASSAY OF PARATHORMONE: CPT

## 2023-04-17 PROCEDURE — 82947 ASSAY GLUCOSE BLOOD QUANT: CPT

## 2023-04-17 PROCEDURE — 82043 UR ALBUMIN QUANTITATIVE: CPT

## 2023-04-17 PROCEDURE — 83550 IRON BINDING TEST: CPT

## 2023-04-17 PROCEDURE — 84156 ASSAY OF PROTEIN URINE: CPT

## 2023-04-17 PROCEDURE — 36415 COLL VENOUS BLD VENIPUNCTURE: CPT

## 2023-04-17 PROCEDURE — 85027 COMPLETE CBC AUTOMATED: CPT

## 2023-04-17 PROCEDURE — 82306 VITAMIN D 25 HYDROXY: CPT

## 2023-04-17 PROCEDURE — 81001 URINALYSIS AUTO W/SCOPE: CPT

## 2023-04-17 PROCEDURE — 80061 LIPID PANEL: CPT

## 2023-04-17 PROCEDURE — 83540 ASSAY OF IRON: CPT

## 2023-04-17 PROCEDURE — 82570 ASSAY OF URINE CREATININE: CPT

## 2023-04-17 NOTE — TELEPHONE ENCOUNTER
Can you call patient's pharmacy and ask what other oral diabetic medications are covered with patient's insurance? ISSAC Carrera, FNP-BC

## 2023-04-17 NOTE — TELEPHONE ENCOUNTER
Patient is already taking glyburide, metformin and Actos.  She will need to meet with the diabetic educator to figure out what else she can take; especially since her kidney function has decreased.  She does have a follow-up with nephrology in May. ISSAC Carrera, FNP-BC

## 2023-04-17 NOTE — TELEPHONE ENCOUNTER
Spoke with pharmacy per providers (Jennyfer Betancourt) request below.    Pharmacy states patient has commercial insurance with probably high deductible and new medications such as Jardiance will likely be just as expensive.    Medications such as  Actos, glipizide, metformin would be covered.    Rina Zaldivar RN

## 2023-04-18 LAB
ALBUMIN SERPL-MCNC: 3.8 G/DL (ref 3.4–5)
ANION GAP SERPL CALCULATED.3IONS-SCNC: 2 MMOL/L (ref 3–14)
BUN SERPL-MCNC: 19 MG/DL (ref 7–30)
CALCIUM SERPL-MCNC: 9.2 MG/DL (ref 8.5–10.1)
CHLORIDE BLD-SCNC: 103 MMOL/L (ref 94–109)
CHOLEST SERPL-MCNC: 111 MG/DL
CO2 SERPL-SCNC: 29 MMOL/L (ref 20–32)
CREAT SERPL-MCNC: 0.59 MG/DL (ref 0.52–1.04)
CREAT UR-MCNC: 33 MG/DL
DEPRECATED CALCIDIOL+CALCIFEROL SERPL-MC: 15 UG/L (ref 20–75)
FASTING STATUS PATIENT QL REPORTED: NO
FASTING STATUS PATIENT QL REPORTED: NO
FERRITIN SERPL-MCNC: 94 NG/ML (ref 8–252)
GFR SERPL CREATININE-BSD FRML MDRD: >90 ML/MIN/1.73M2
GLUCOSE BLD-MCNC: 246 MG/DL (ref 70–99)
GLUCOSE BLD-MCNC: 262 MG/DL (ref 70–99)
HDLC SERPL-MCNC: 67 MG/DL
IRON SATN MFR SERPL: 31 % (ref 15–46)
IRON SERPL-MCNC: 93 UG/DL (ref 35–180)
LDLC SERPL CALC-MCNC: 24 MG/DL
MICROALBUMIN UR-MCNC: 10 MG/L
MICROALBUMIN/CREAT UR: 30.3 MG/G CR (ref 0–25)
NONHDLC SERPL-MCNC: 44 MG/DL
PHOSPHATE SERPL-MCNC: 2.6 MG/DL (ref 2.5–4.5)
POTASSIUM BLD-SCNC: 4.7 MMOL/L (ref 3.4–5.3)
SODIUM SERPL-SCNC: 134 MMOL/L (ref 133–144)
TIBC SERPL-MCNC: 299 UG/DL (ref 240–430)
TRIGL SERPL-MCNC: 100 MG/DL

## 2023-04-18 NOTE — TELEPHONE ENCOUNTER
Called patient using [place]  services. Partial information given per below orders/message from PCP. Patient then asked if we could hold on a second, and never returned to the phone.    Will need to call again later.    Radha JOHN BSN  Triage Nurse  [facility]: [facility]  Ph: [phone]

## 2023-04-19 NOTE — TELEPHONE ENCOUNTER
Attempt #2    Using a [place] , left message for patient to call [facility] at [phone].    Ritchie Flores RN

## 2023-04-20 NOTE — TELEPHONE ENCOUNTER
Called patient using [place]  .  Did they answer the phone: No, left a message on voicemail to return call to the [facility] at [phone], and to ask for any available triage nurse.    Radha RN  Triage Nurse  [facility]: [facility]

## 2023-04-20 NOTE — TELEPHONE ENCOUNTER
DIAGNOSIS: CKD (chronic kidney disease) stage 4, GFR 15-29 ml/min    DATE RECEIVED: 05.16.2023   NOTES STATUS DETAILS   OFFICE NOTE from referring provider Internal 04.03.2023 Jennyfer Perez NP   OFFICE NOTE from other specialist      *Only VASCULITIS or LUPUS gather office notes for the following     *PULMONARY       *ENT     *DERMATOLOGY     *RHEUMATOLOGY     DISCHARGE SUMMARY from hospital     DISCHARGE REPORT from the ER     MEDICATION LIST Internal    IMAGING  (NEED IMAGES AND REPORTS)     KIDNEY CT SCAN     KIDNEY ULTRASOUND     MR ABDOMEN     NUCLEAR MEDICINE RENAL     LABS     CBC Internal 04.17.2023   CMP Internal 04.17.2023   BMP Internal 02.28.2023   UA Internal 04.17.2023   URINE PROTEIN Internal 04.17.2023   RENAL PANEL Internal 04.17.2023   BIOPSY     KIDNEY BIOPSY

## 2023-04-21 NOTE — TELEPHONE ENCOUNTER
Please send pt a letter she needs to meet with a diabetic educator :     Patient is already taking glyburide, metformin and Actos.  She will need to meet with the diabetic educator to figure out what else she can take; especially since her kidney function has decreased.  She does have a follow-up with nephrology in May. ISSAC Carrera, [facility]-BC     Rica Shepherd RN  [facility]

## 2023-04-21 NOTE — RESULT ENCOUNTER NOTE
Papi Grimm,    Thank you for your recent office visit.    Here are your recent results.  Your glucose is still high.  I think you should meet with the diabetic educator since were having trouble getting other diabetic meds covered with your insurance.  Uncontrolled diabetes can cause kidney damage as evidenced by the albumin in your urine, it increases your risk for heart attack, stroke, blindness, etc.  So far your kidney function still appears normal.  Continue to work on diet changes, daily exercise and weight.  Your cholesterol looks good.    Feel free to contact me via [employer] or call the clinic at [phone].    Sincerely,    ISSAC Carrera, FNP-BC

## 2023-04-26 ENCOUNTER — TELEPHONE (OUTPATIENT)
Dept: FAMILY MEDICINE | Facility: CLINIC | Age: 63
End: 2023-04-26
Payer: COMMERCIAL

## 2023-04-26 NOTE — RESULT ENCOUNTER NOTE
Patient has not viewed [employer] message please call:    Papi Grimm,     Thank you for your recent office visit.     Here are your recent results.  Your glucose is still high.  I think you should meet with the diabetic educator since were having trouble getting other diabetic meds covered with your insurance.  Uncontrolled diabetes can cause kidney damage as evidenced by the albumin in your urine, it increases your risk for heart attack, stroke, blindness, etc.  So far your kidney function still appears normal.  Continue to work on diet changes, daily exercise and weight.  Your cholesterol looks good.     Feel free to contact me via [employer] or call the clinic at [phone].     Sincerely,     ISSAC Carrera, FNP-BC  ISSAC Carrera, FNP-BC-

## 2023-04-26 NOTE — TELEPHONE ENCOUNTER
See providers (Jennyfer Betancourt)lab results message below.    Called patient with [place] , no answer,  left message on voicemail to call [facility] at [phone] and ask to speak with a nurse.    Rina Zaldivar RN        Referral: Adult Diabetes Educator    Referred By  Referred To        Comment: If you have not heard from the scheduling office within 2 business days, please call [phone] for [facility], [phone] for the [facility] or [phone] for [facility].

## 2023-04-26 NOTE — TELEPHONE ENCOUNTER
----- Message from Jennyfer Perez NP sent at 4/26/2023  9:12 AM CDT -----  Patient has not viewed [employer] message please call:    Papi Grimm,     Thank you for your recent office visit.     Here are your recent results.  Your glucose is still high.  I think you should meet with the diabetic educator since were having trouble getting other diabetic meds covered with your insurance.  Uncontrolled diabetes can cause kidney damage as evidenced by the albumin in your urine, it increases your risk for heart attack, stroke, blindness, etc.  So far your kidney function still appears normal.  Continue to work on diet changes, daily exercise and weight.  Your cholesterol looks good.     Feel free to contact me via [employer] or call the clinic at [phone].     Sincerely,     ISSCA Carrera, FNP-BC  ISSAC Carrera, FNP-BC-

## 2023-04-27 NOTE — TELEPHONE ENCOUNTER
Called [phone] (home) using . Did they answer the phone: No, left a message on voicemail to return call to the [facility] at [phone], and to ask for any available triage nurse.    Radha RN  Triage Nurse  [facility]: [facility]

## 2023-04-28 NOTE — TELEPHONE ENCOUNTER
I called   Services, placed call to patient with assistance of [place]ican  #[id number].    While waiting for call to connect, I see diabetes ed has tried to contact patient in the past (March 2023).      From what I can tell, diabetes ed visit did not happen (was cancelled?).    Spoke to patient with assistance of .   Relayed lab result message.   Patient says her blood sugars are better in the AM, it was elevated recently in clinic as it was checked in the afternoon.  She says it is usually between  in the AM.    She says she saw diabetes ed about a year ago and spent 2 hours with them and just talked about diet and exercise.  She is not interested in seeing diabetes ed again, does not think it is worthwhile.    Perhaps provider can just discuss the issue with diabetes educator?    Patient says her current meds are too expensive for her.    She IS planning to see nephrology 5/16 as scheduled.    Routed back to Jennyfer Betancourt for response, repeat glucose in AM via lab only visit?     Provider consult with diabetes ed?    Long call with , connection seemed to fail at the end.        Routed to PCP to address possible alternative plan.    Christina Mi, RN  [facility]

## 2023-05-10 ENCOUNTER — DOCUMENTATION ONLY (OUTPATIENT)
Dept: LAB | Facility: CLINIC | Age: 63
End: 2023-05-10
Payer: COMMERCIAL

## 2023-05-15 DIAGNOSIS — I10 ESSENTIAL HYPERTENSION WITH GOAL BLOOD PRESSURE LESS THAN 140/90: Primary | ICD-10-CM

## 2023-05-16 ENCOUNTER — LAB (OUTPATIENT)
Dept: LAB | Facility: CLINIC | Age: 63
End: 2023-05-16
Payer: COMMERCIAL

## 2023-05-16 ENCOUNTER — OFFICE VISIT (OUTPATIENT)
Dept: NEPHROLOGY | Facility: CLINIC | Age: 63
End: 2023-05-16
Attending: NURSE PRACTITIONER
Payer: COMMERCIAL

## 2023-05-16 ENCOUNTER — PRE VISIT (OUTPATIENT)
Dept: NEPHROLOGY | Facility: CLINIC | Age: 63
End: 2023-05-16

## 2023-05-16 VITALS
SYSTOLIC BLOOD PRESSURE: 155 MMHG | HEIGHT: 60 IN | HEART RATE: 84 BPM | DIASTOLIC BLOOD PRESSURE: 80 MMHG | WEIGHT: 142.9 LBS | OXYGEN SATURATION: 99 % | BODY MASS INDEX: 28.06 KG/M2

## 2023-05-16 DIAGNOSIS — E55.9 HYPOVITAMINOSIS D: Primary | ICD-10-CM

## 2023-05-16 DIAGNOSIS — E11.65 TYPE 2 DIABETES MELLITUS WITH HYPERGLYCEMIA, WITHOUT LONG-TERM CURRENT USE OF INSULIN (H): ICD-10-CM

## 2023-05-16 DIAGNOSIS — N17.9 AKI (ACUTE KIDNEY INJURY) (H): ICD-10-CM

## 2023-05-16 DIAGNOSIS — I10 ESSENTIAL HYPERTENSION WITH GOAL BLOOD PRESSURE LESS THAN 140/90: ICD-10-CM

## 2023-05-16 DIAGNOSIS — I10 HYPERTENSION, ESSENTIAL: ICD-10-CM

## 2023-05-16 DIAGNOSIS — N18.1 CHRONIC KIDNEY DISEASE, STAGE 1: ICD-10-CM

## 2023-05-16 DIAGNOSIS — E87.5 HYPERKALEMIA: ICD-10-CM

## 2023-05-16 LAB
ALBUMIN SERPL BCG-MCNC: 4.7 G/DL (ref 3.5–5.2)
ANION GAP SERPL CALCULATED.3IONS-SCNC: 6 MMOL/L (ref 7–15)
BUN SERPL-MCNC: 20.6 MG/DL (ref 8–23)
CALCIUM SERPL-MCNC: 10.1 MG/DL (ref 8.8–10.2)
CHLORIDE SERPL-SCNC: 100 MMOL/L (ref 98–107)
CREAT SERPL-MCNC: 0.73 MG/DL (ref 0.51–0.95)
DEPRECATED HCO3 PLAS-SCNC: 33 MMOL/L (ref 22–29)
GFR SERPL CREATININE-BSD FRML MDRD: >90 ML/MIN/1.73M2
GLUCOSE SERPL-MCNC: 236 MG/DL (ref 70–99)
PHOSPHATE SERPL-MCNC: 3.4 MG/DL (ref 2.5–4.5)
POTASSIUM SERPL-SCNC: 5.5 MMOL/L (ref 3.4–5.3)
SODIUM SERPL-SCNC: 139 MMOL/L (ref 136–145)

## 2023-05-16 PROCEDURE — 99205 OFFICE O/P NEW HI 60 MIN: CPT | Mod: GC | Performed by: INTERNAL MEDICINE

## 2023-05-16 PROCEDURE — 36415 COLL VENOUS BLD VENIPUNCTURE: CPT | Performed by: PATHOLOGY

## 2023-05-16 PROCEDURE — 80069 RENAL FUNCTION PANEL: CPT | Performed by: PATHOLOGY

## 2023-05-16 PROCEDURE — G0463 HOSPITAL OUTPT CLINIC VISIT: HCPCS | Performed by: INTERNAL MEDICINE

## 2023-05-16 RX ORDER — ERGOCALCIFEROL 1.25 MG/1
50000 CAPSULE, LIQUID FILLED ORAL WEEKLY
Qty: 8 CAPSULE | Refills: 0 | Status: SHIPPED | OUTPATIENT
Start: 2023-05-16 | End: 2023-07-05

## 2023-05-16 NOTE — NURSING NOTE
"Chief Complaint   Patient presents with     Consult     CKD stage 4     BP (!) 155/80   Pulse 84   Ht 1.516 m (4' 11.69\")   Wt 64.8 kg (142 lb 14.4 oz)   LMP  (LMP Unknown)   SpO2 99%   BMI 28.20 kg/m        Stevan Escobar MA    "

## 2023-05-16 NOTE — LETTER
5/16/2023       RE: Alfa Sung  [address] [facility] [address]     Dear Colleague,    Thank you for referring your patient, Alfa Sung, to the [facility] [place] at [facility]. Please see a copy of my visit note below.      Nephrology Clinic Note  May 16, 2023    I was asked to see this patient by Jennyfer Perez NP    CC: NITA/CKD    HPI: Alfa Sung is a 62 year old female with T2DM, HTN,HLD, arthritis of b/l knees who presents for evaluation of NITA/CKD and to establish care in Nephrology.  She was seen with a Malagasy iPad .    -T2DM for ~10-20 yrs  -HTN for ~12yrs  - History of urinary symptoms: No  - History of Hematuria: no  - Swelling: No  - No CP or SOB, or Orthopnea  - Hx of UTIs: No  - Hx of stones: no  - Rashes/Joint pain: Chronic knee pain   - Family hx of kidney disease: No  - NSAID use: No  - Home Blood sugars:   -Does not take BP at home    Allergies:   No Known Allergies    Medications:  atorvastatin (LIPITOR) 40 MG tablet, Take 1 tablet (40 mg) by mouth daily for cholesterol  blood glucose (NO BRAND SPECIFIED) lancets standard, Use to test blood sugar 2 times daily or as directed.  blood glucose (NO BRAND SPECIFIED) test strip, Dispense item covered by pt ins. E11.9 NIDDM type II - Test 2 times/day. Reason: High A1C  blood glucose monitoring (NO BRAND SPECIFIED) meter device kit, Use to test blood sugar 2 times daily or as directed.  Blood Glucose Monitoring Suppl (FIFTY50 GLUCOSE METER 2.0) W/DEVICE KIT, Dispense meter, test strips, lancets covered by pt ins. E11.65 NIDDM type II, uncontrolled - Test 3 times/day, Reason: High A1C  diclofenac (VOLTAREN) 1 % topical gel, Apply 4 g topically 4 times daily  glyBURIDE-metFORMIN (GLUCOVANCE) 5-500 MG tablet, Take 2 tablets by mouth 2 times daily (with meals)  lisinopril (ZESTRIL) 20 MG tablet, Take 1 tablet (20 mg) by mouth  daily  pioglitazone (ACTOS) 30 MG tablet, Take 1 tablet (30 mg) by mouth daily Please notify patient of dose change please put directions in Serbian if possible    No current facility-administered medications on file prior to visit.      Past Medical History:  Past Medical History:   Diagnosis Date    Arthritis     Hypertension     Nonsenile cataract     Pterygium of eye        Past Surgical History:  Past Surgical History:   Procedure Laterality Date    CATARACT IOL, RT/LT Bilateral     2013       Social History:  Social History     Tobacco Use    Smoking status: Never    Smokeless tobacco: Never   Vaping Use    Vaping status: Never Used     Passive vaping exposure: Yes   Substance Use Topics    Alcohol use: No    Drug use: No       Family History:  Family History   Problem Relation Age of Onset    Glaucoma No family hx of     Macular Degeneration No family hx of        ROS: A 12 system review of systems was negative other than noted here or above.     Exam:  LMP  (LMP Unknown)     GENERAL APPEARANCE: alert and no distress  EYES: PERRL, no scleral icterus  HENT: mouth without ulcers or lesions  NECK: supple, no adenopathy  RESP: lungs clear to auscultation   CV: regular rhythm, normal rate, no rub  Extremities: no clubbing, cyanosis, or edema  SKIN: no rash  NEURO: mentation intact and speech normal  PSYCH: affect normal/bright      Results:    No visits with results within 1 Day(s) from this visit.   Latest known visit with results is:   Lab on 04/17/2023   Component Date Value Ref Range Status    Glucose 04/17/2023 262 (H)  70 - 99 mg/dL Final    Patient Fasting > 8hrs? 04/17/2023 No   Final    Cholesterol 04/17/2023 111  <200 mg/dL Final    Triglycerides 04/17/2023 100  <150 mg/dL Final    Direct Measure HDL 04/17/2023 67  >=50 mg/dL Final    LDL Cholesterol Calculated 04/17/2023 24  <=100 mg/dL Final    Non HDL Cholesterol 04/17/2023 44  <130 mg/dL Final    Patient Fasting > 8hrs? 04/17/2023 No   Final     Sodium 04/17/2023 134  133 - 144 mmol/L Final    Potassium 04/17/2023 4.7  3.4 - 5.3 mmol/L Final    Chloride 04/17/2023 103  94 - 109 mmol/L Final    Carbon Dioxide (CO2) 04/17/2023 29  20 - 32 mmol/L Final    Anion Gap 04/17/2023 2 (L)  3 - 14 mmol/L Final    Urea Nitrogen 04/17/2023 19  7 - 30 mg/dL Final    Creatinine 04/17/2023 0.59  0.52 - 1.04 mg/dL Final    Calcium 04/17/2023 9.2  8.5 - 10.1 mg/dL Final    Glucose 04/17/2023 246 (H)  70 - 99 mg/dL Final    Albumin 04/17/2023 3.8  3.4 - 5.0 g/dL Final    Phosphorus 04/17/2023 2.6  2.5 - 4.5 mg/dL Final    GFR Estimate 04/17/2023 >90  >60 mL/min/1.73m2 Final    eGFR calculated using 2021 CKD-EPI equation.    Creatinine Urine mg/dL 04/17/2023 33  mg/dL Final    Albumin Urine mg/L 04/17/2023 10  mg/L Final    Albumin Urine mg/g Cr 04/17/2023 30.30 (H)  0.00 - 25.00 mg/g Cr Final    WBC Count 04/17/2023 6.5  4.0 - 11.0 10e3/uL Final    RBC Count 04/17/2023 3.55 (L)  3.80 - 5.20 10e6/uL Final    Hemoglobin 04/17/2023 11.1 (L)  11.7 - 15.7 g/dL Final    Hematocrit 04/17/2023 34.4 (L)  35.0 - 47.0 % Final    MCV 04/17/2023 97  78 - 100 fL Final    MCH 04/17/2023 31.3  26.5 - 33.0 pg Final    MCHC 04/17/2023 32.3  31.5 - 36.5 g/dL Final    RDW 04/17/2023 14.6  10.0 - 15.0 % Final    Platelet Count 04/17/2023 191  150 - 450 10e3/uL Final    Total Protein Urine mg/dL 04/17/2023 6.6  1.0 - 14.0 mg/dL Final    The reference ranges have not been established in urine protein. The results should be integrated into the clinical context for interpretation.    Total Protein UR MG/MG CR 04/17/2023 0.21 (H)  0.00 - 0.20 mg/mg Cr Final    Creatinine Urine mg/dL 04/17/2023 32.0  mg/dL Final    The reference ranges have not been established in urine creatinine. The results should be integrated into the clinical context for interpretation.    Color Urine 04/17/2023 Yellow  Colorless, Straw, Light Yellow, Yellow Final    Appearance Urine 04/17/2023 Clear  Clear Final    Glucose Urine  04/17/2023 500 (A)  Negative mg/dL Final    Bilirubin Urine 04/17/2023 Negative  Negative Final    Ketones Urine 04/17/2023 Negative  Negative mg/dL Final    Specific Gravity Urine 04/17/2023 1.010  1.003 - 1.035 Final    Blood Urine 04/17/2023 Negative  Negative Final    pH Urine 04/17/2023 6.5  5.0 - 7.0 Final    Protein Albumin Urine 04/17/2023 Negative  Negative mg/dL Final    Urobilinogen Urine 04/17/2023 0.2  0.2, 1.0 E.U./dL Final    Nitrite Urine 04/17/2023 Negative  Negative Final    Leukocyte Esterase Urine 04/17/2023 Moderate (A)  Negative Final    Parathyroid Hormone Intact 04/17/2023 38  15 - 65 pg/mL Final    Vitamin D, Total (25-Hydroxy) 04/17/2023 15 (L)  20 - 75 ug/L Final    Ferritin 04/17/2023 94  8 - 252 ng/mL Final    Iron 04/17/2023 93  35 - 180 ug/dL Final    Iron Binding Capacity 04/17/2023 299  240 - 430 ug/dL Final    Iron Sat Index 04/17/2023 31  15 - 46 % Final    Bacteria Urine 04/17/2023 Moderate (A)  None Seen /HPF Final    RBC Urine 04/17/2023 5-10 (A)  0-2 /HPF /HPF Final    WBC Urine 04/17/2023 25-50 (A)  0-5 /HPF /HPF Final    Squamous Epithelials Urine 04/17/2023 None Seen  None Seen /LPF Final    WBC Clumps Urine 04/17/2023 Present (A)  None Seen /HPF Final       Assessment/Plan:   Alfa Sung is a 62 year old female with T2DM, HTN,HLD, arthritis of b/l knees who presents for evaluation of NITA/CKD and to establish care in Nephrology.  She was seen with a Sky[employer] iPad .    NITA: Creatinine baseline has been 0.5-0.7 up until 2/2023 when her creatinine peaked to 1.96, eGFR 28.  Since then her creatinine has down trended to 0.73, EGFR >90.  No signs of ongoing kidney dysfunction.  Unclear based on discussion what symptoms she was having around 2/2023, which was also associated with a mildly dirty UA.  Either way she is high risk for CKD given her longstanding uncontrolled diabetes.  Currently she only has 0.21 g/g of proteinuria; and only signs of microscopic hematuria  on a previous UA (4/17/2023).  --Continue 2 L/day    Hypertension:  Blood pressure elevated to 155/80 today.  Patient does not check blood pressures at home.  We will give her a cuff to start checking at home.  Currently on ACEi which is renal protective.  -- Continue lisinopril 20 mg daily  [place] [place]    T2DM: Last A1c 13.9% (2/28/2023).  Currently on pioglitazone 30 mg daily, glyburide 5mg-metformin 500mg combo pill, which she is supposed to be taking 2 pills twice daily, however she reports that she is taking 1 pill twice daily and later changes discussion back-and-forth between medications; thus it is unclear what her current regimen is at home.  She also has all of her medications mixed together in 1 bottle, making it difficult to assess compliance.  Would recommend setting up a pillbox for her, and will discuss this with her PCP.  In terms of diabetes control, would recommend transitioning from pioglitazone to Jardiance for renal protection, cardiac protection, diabetes control.  -- Consider stopping pioglitazone; consider starting Jardiance.  --Consider setting up with a pillbox, home RN services if eligible    Hyperkalemia  Electrolytes : K is 5.5, in the setting of a glucose of greater than 200.  Suspect that hyperkalemia is due to hyperglycemia and insulin resistance.  Recommend continuing to monitor and improved diabetes control with medications and diet.  All other electrolytes within normal limits.      BMD: Ca, Phos, PTH WNL. Vit D level low at 15.   -- Start vitamin D 2 1250mcg weekly x8wks    Metabolic alkalosis: Bicarb 33 no reports of vomiting or GI issues.  Possibly compensatory response from respiratory acidosis that is undiagnosed.  -- Check VBG at next visit    Anemia: Hgb 11.1 on 4/17/23.  Iron studies within normal limits on last check.    Chronic b/l Knee pain 2/2 arthritis: Managed by PCP  -- Avoid NSAID use    Return in about 1 year (around  5/16/2024).    Patient was seen and dicussed with Beatriz Muñoz MD Ashwini Arumugam, PGY3  Internal Medicine-Pediatrics  Nephrology Clinic Resident      Attestation signed by Beatriz Blank MD at 7/31/2023 10:29 PM:      Physician Attestation  I saw this patient with the resident and agree with the resident/fellow's findings and plan of care as documented in the note.      Beatriz Blank MD  Date of Service (when I saw the patient): 5/16/2023      Again, thank you for allowing me to participate in the care of your patient.      Sincerely,    Beatriz Blank MD

## 2023-05-16 NOTE — PROGRESS NOTES
Nephrology Clinic Note  May 16, 2023    I was asked to see this patient by Jennyfer Perez NP    CC: NITA/CKD    HPI: Alfa Sung is a 62 year old female with T2DM, HTN,HLD, arthritis of b/l knees who presents for evaluation of NITA/CKD and to establish care in Nephrology.  She was seen with a Comoran iPad .    -T2DM for ~10-20 yrs  -HTN for ~12yrs  - History of urinary symptoms: No  - History of Hematuria: no  - Swelling: No  - No CP or SOB, or Orthopnea  - Hx of UTIs: No  - Hx of stones: no  - Rashes/Joint pain: Chronic knee pain   - Family hx of kidney disease: No  - NSAID use: No  - Home Blood sugars:   -Does not take BP at home    Allergies:   No Known Allergies    Medications:  atorvastatin (LIPITOR) 40 MG tablet, Take 1 tablet (40 mg) by mouth daily for cholesterol  blood glucose (NO BRAND SPECIFIED) lancets standard, Use to test blood sugar 2 times daily or as directed.  blood glucose (NO BRAND SPECIFIED) test strip, Dispense item covered by pt ins. E11.9 NIDDM type II - Test 2 times/day. Reason: High A1C  blood glucose monitoring (NO BRAND SPECIFIED) meter device kit, Use to test blood sugar 2 times daily or as directed.  Blood Glucose Monitoring Suppl (FIFTY50 GLUCOSE METER 2.0) W/DEVICE KIT, Dispense meter, test strips, lancets covered by pt ins. E11.65 NIDDM type II, uncontrolled - Test 3 times/day, Reason: High A1C  diclofenac (VOLTAREN) 1 % topical gel, Apply 4 g topically 4 times daily  glyBURIDE-metFORMIN (GLUCOVANCE) 5-500 MG tablet, Take 2 tablets by mouth 2 times daily (with meals)  lisinopril (ZESTRIL) 20 MG tablet, Take 1 tablet (20 mg) by mouth daily  pioglitazone (ACTOS) 30 MG tablet, Take 1 tablet (30 mg) by mouth daily Please notify patient of dose change please put directions in Comoran if possible    No current facility-administered medications on file prior to visit.      Past Medical History:  Past Medical History:   Diagnosis Date     Arthritis       Hypertension      Nonsenile cataract      Pterygium of eye        Past Surgical History:  Past Surgical History:   Procedure Laterality Date     CATARACT IOL, RT/LT Bilateral     2013       Social History:  Social History     Tobacco Use     Smoking status: Never     Smokeless tobacco: Never   Vaping Use     Vaping status: Never Used     Passive vaping exposure: Yes   Substance Use Topics     Alcohol use: No     Drug use: No       Family History:  Family History   Problem Relation Age of Onset     Glaucoma No family hx of      Macular Degeneration No family hx of        ROS: A 12 system review of systems was negative other than noted here or above.     Exam:  LMP  (LMP Unknown)     GENERAL APPEARANCE: alert and no distress  EYES: PERRL, no scleral icterus  HENT: mouth without ulcers or lesions  NECK: supple, no adenopathy  RESP: lungs clear to auscultation   CV: regular rhythm, normal rate, no rub  Extremities: no clubbing, cyanosis, or edema  SKIN: no rash  NEURO: mentation intact and speech normal  PSYCH: affect normal/bright      Results:    No visits with results within 1 Day(s) from this visit.   Latest known visit with results is:   Lab on 04/17/2023   Component Date Value Ref Range Status     Glucose 04/17/2023 262 (H)  70 - 99 mg/dL Final     Patient Fasting > 8hrs? 04/17/2023 No   Final     Cholesterol 04/17/2023 111  <200 mg/dL Final     Triglycerides 04/17/2023 100  <150 mg/dL Final     Direct Measure HDL 04/17/2023 67  >=50 mg/dL Final     LDL Cholesterol Calculated 04/17/2023 24  <=100 mg/dL Final     Non HDL Cholesterol 04/17/2023 44  <130 mg/dL Final     Patient Fasting > 8hrs? 04/17/2023 No   Final     Sodium 04/17/2023 134  133 - 144 mmol/L Final     Potassium 04/17/2023 4.7  3.4 - 5.3 mmol/L Final     Chloride 04/17/2023 103  94 - 109 mmol/L Final     Carbon Dioxide (CO2) 04/17/2023 29  20 - 32 mmol/L Final     Anion Gap 04/17/2023 2 (L)  3 - 14 mmol/L Final     Urea Nitrogen 04/17/2023 19  7 - 30  mg/dL Final     Creatinine 04/17/2023 0.59  0.52 - 1.04 mg/dL Final     Calcium 04/17/2023 9.2  8.5 - 10.1 mg/dL Final     Glucose 04/17/2023 246 (H)  70 - 99 mg/dL Final     Albumin 04/17/2023 3.8  3.4 - 5.0 g/dL Final     Phosphorus 04/17/2023 2.6  2.5 - 4.5 mg/dL Final     GFR Estimate 04/17/2023 >90  >60 mL/min/1.73m2 Final    eGFR calculated using 2021 CKD-EPI equation.     Creatinine Urine mg/dL 04/17/2023 33  mg/dL Final     Albumin Urine mg/L 04/17/2023 10  mg/L Final     Albumin Urine mg/g Cr 04/17/2023 30.30 (H)  0.00 - 25.00 mg/g Cr Final     WBC Count 04/17/2023 6.5  4.0 - 11.0 10e3/uL Final     RBC Count 04/17/2023 3.55 (L)  3.80 - 5.20 10e6/uL Final     Hemoglobin 04/17/2023 11.1 (L)  11.7 - 15.7 g/dL Final     Hematocrit 04/17/2023 34.4 (L)  35.0 - 47.0 % Final     MCV 04/17/2023 97  78 - 100 fL Final     MCH 04/17/2023 31.3  26.5 - 33.0 pg Final     MCHC 04/17/2023 32.3  31.5 - 36.5 g/dL Final     RDW 04/17/2023 14.6  10.0 - 15.0 % Final     Platelet Count 04/17/2023 191  150 - 450 10e3/uL Final     Total Protein Urine mg/dL 04/17/2023 6.6  1.0 - 14.0 mg/dL Final    The reference ranges have not been established in urine protein. The results should be integrated into the clinical context for interpretation.     Total Protein UR MG/MG CR 04/17/2023 0.21 (H)  0.00 - 0.20 mg/mg Cr Final     Creatinine Urine mg/dL 04/17/2023 32.0  mg/dL Final    The reference ranges have not been established in urine creatinine. The results should be integrated into the clinical context for interpretation.     Color Urine 04/17/2023 Yellow  Colorless, Straw, Light Yellow, Yellow Final     Appearance Urine 04/17/2023 Clear  Clear Final     Glucose Urine 04/17/2023 500 (A)  Negative mg/dL Final     Bilirubin Urine 04/17/2023 Negative  Negative Final     Ketones Urine 04/17/2023 Negative  Negative mg/dL Final     Specific Gravity Urine 04/17/2023 1.010  1.003 - 1.035 Final     Blood Urine 04/17/2023 Negative  Negative Final      pH Urine 04/17/2023 6.5  5.0 - 7.0 Final     Protein Albumin Urine 04/17/2023 Negative  Negative mg/dL Final     Urobilinogen Urine 04/17/2023 0.2  0.2, 1.0 E.U./dL Final     Nitrite Urine 04/17/2023 Negative  Negative Final     Leukocyte Esterase Urine 04/17/2023 Moderate (A)  Negative Final     Parathyroid Hormone Intact 04/17/2023 38  15 - 65 pg/mL Final     Vitamin D, Total (25-Hydroxy) 04/17/2023 15 (L)  20 - 75 ug/L Final     Ferritin 04/17/2023 94  8 - 252 ng/mL Final     Iron 04/17/2023 93  35 - 180 ug/dL Final     Iron Binding Capacity 04/17/2023 299  240 - 430 ug/dL Final     Iron Sat Index 04/17/2023 31  15 - 46 % Final     Bacteria Urine 04/17/2023 Moderate (A)  None Seen /HPF Final     RBC Urine 04/17/2023 5-10 (A)  0-2 /HPF /HPF Final     WBC Urine 04/17/2023 25-50 (A)  0-5 /HPF /HPF Final     Squamous Epithelials Urine 04/17/2023 None Seen  None Seen /LPF Final     WBC Clumps Urine 04/17/2023 Present (A)  None Seen /HPF Final       Assessment/Plan:   Alfa Sung is a 62 year old female with T2DM, HTN,HLD, arthritis of b/l knees who presents for evaluation of NITA/CKD and to establish care in Nephrology.  She was seen with a [place]otho iPad .    NITA: Creatinine baseline has been 0.5-0.7 up until 2/2023 when her creatinine peaked to 1.96, eGFR 28.  Since then her creatinine has down trended to 0.73, EGFR >90.  No signs of ongoing kidney dysfunction.  Unclear based on discussion what symptoms she was having around 2/2023, which was also associated with a mildly dirty UA.  Either way she is high risk for CKD given her longstanding uncontrolled diabetes.  Currently she only has 0.21 g/g of proteinuria; and only signs of microscopic hematuria on a previous UA (4/17/2023).  --Continue 2 L/day    Hypertension:  Blood pressure elevated to 155/80 today.  Patient does not check blood pressures at home.  We will give her a cuff to start checking at home.  Currently on ACEi which is renal  protective.  -- Continue lisinopril 20 mg daily  [place] [place]    T2DM: Last A1c 13.9% (2/28/2023).  Currently on pioglitazone 30 mg daily, glyburide 5mg-metformin 500mg combo pill, which she is supposed to be taking 2 pills twice daily, however she reports that she is taking 1 pill twice daily and later changes discussion back-and-forth between medications; thus it is unclear what her current regimen is at home.  She also has all of her medications mixed together in 1 bottle, making it difficult to assess compliance.  Would recommend setting up a pillbox for her, and will discuss this with her PCP.  In terms of diabetes control, would recommend transitioning from pioglitazone to Jardiance for renal protection, cardiac protection, diabetes control.  -- Consider stopping pioglitazone; consider starting Jardiance.  --Consider setting up with a pillbox, home RN services if eligible    Hyperkalemia  Electrolytes : K is 5.5, in the setting of a glucose of greater than 200.  Suspect that hyperkalemia is due to hyperglycemia and insulin resistance.  Recommend continuing to monitor and improved diabetes control with medications and diet.  All other electrolytes within normal limits.      BMD: Ca, Phos, PTH WNL. Vit D level low at 15.   -- Start vitamin D 2 1250mcg weekly x8wks    Metabolic alkalosis: Bicarb 33 no reports of vomiting or GI issues.  Possibly compensatory response from respiratory acidosis that is undiagnosed.  -- Check VBG at next visit    Anemia: Hgb 11.1 on 4/17/23.  Iron studies within normal limits on last check.    Chronic b/l Knee pain 2/2 arthritis: Managed by PCP  -- Avoid NSAID use    Return in about 1 year (around 5/16/2024).    Patient was seen and dicussed with Beatriz Muñoz MD Ashwini Arumugam, PGY3  Internal Medicine-Pediatrics  Nephrology Clinic Resident

## 2023-05-16 NOTE — PATIENT INSTRUCTIONS
Maintain good hydration   Control your blood glucose level and BP well  Started on vitamin D, once a week for total of 8 weeks

## 2023-06-04 ENCOUNTER — HEALTH MAINTENANCE LETTER (OUTPATIENT)
Age: 63
End: 2023-06-04

## 2023-07-31 PROBLEM — N18.1 CHRONIC KIDNEY DISEASE, STAGE 1: Status: ACTIVE | Noted: 2023-07-31

## 2023-08-15 NOTE — TELEPHONE ENCOUNTER
HYSTEROSCOPY DEFICIT 1400ML, DR. RO AWARE.   This writer attempted to contact patient on 06/05/20 with .       Reason for call result and left message.      If patient calls back:   1st floor [facility] Care Team (MA/TC) called. Inform patient that someone from the team will contact them, document that pt called and route to care team.         Liliam Harper MA

## 2023-08-21 ENCOUNTER — TELEPHONE (OUTPATIENT)
Dept: FAMILY MEDICINE | Facility: CLINIC | Age: 63
End: 2023-08-21
Payer: COMMERCIAL

## 2023-08-21 NOTE — TELEPHONE ENCOUNTER
Patient Quality Outreach    Patient is due for the following:   Diabetes -  A1C  Colon Cancer Screening  Physical Preventive Adult Physical      Topic Date Due    Pneumococcal Vaccine (1 - PCV) Never done    Zoster (Shingles) Vaccine (1 of 2) Never done       Next Steps:   Schedule a Adult Preventative    Type of outreach:    Sent [employer] message.      Questions for provider review:    None           Ana Santana MA

## 2023-09-14 ENCOUNTER — OFFICE VISIT (OUTPATIENT)
Dept: FAMILY MEDICINE | Facility: CLINIC | Age: 63
End: 2023-09-14
Payer: COMMERCIAL

## 2023-09-14 VITALS
SYSTOLIC BLOOD PRESSURE: 162 MMHG | WEIGHT: 145 LBS | HEIGHT: 60 IN | DIASTOLIC BLOOD PRESSURE: 82 MMHG | RESPIRATION RATE: 18 BRPM | TEMPERATURE: 98.5 F | HEART RATE: 91 BPM | OXYGEN SATURATION: 100 % | BODY MASS INDEX: 28.47 KG/M2

## 2023-09-14 DIAGNOSIS — M25.562 CHRONIC PAIN OF BOTH KNEES: ICD-10-CM

## 2023-09-14 DIAGNOSIS — M25.561 CHRONIC PAIN OF BOTH KNEES: ICD-10-CM

## 2023-09-14 DIAGNOSIS — E78.5 HYPERLIPIDEMIA LDL GOAL <100: ICD-10-CM

## 2023-09-14 DIAGNOSIS — Z12.11 SCREEN FOR COLON CANCER: ICD-10-CM

## 2023-09-14 DIAGNOSIS — Z23 NEED FOR PROPHYLACTIC VACCINATION AND INOCULATION AGAINST INFLUENZA: ICD-10-CM

## 2023-09-14 DIAGNOSIS — G89.29 CHRONIC PAIN OF BOTH KNEES: ICD-10-CM

## 2023-09-14 DIAGNOSIS — I10 ESSENTIAL HYPERTENSION WITH GOAL BLOOD PRESSURE LESS THAN 140/90: ICD-10-CM

## 2023-09-14 DIAGNOSIS — E11.65 TYPE 2 DIABETES MELLITUS WITH HYPERGLYCEMIA, WITHOUT LONG-TERM CURRENT USE OF INSULIN (H): Primary | ICD-10-CM

## 2023-09-14 LAB — HBA1C MFR BLD: 8.1 % (ref 0–5.6)

## 2023-09-14 PROCEDURE — 99214 OFFICE O/P EST MOD 30 MIN: CPT | Mod: 25 | Performed by: PHYSICIAN ASSISTANT

## 2023-09-14 PROCEDURE — 36415 COLL VENOUS BLD VENIPUNCTURE: CPT | Performed by: PHYSICIAN ASSISTANT

## 2023-09-14 PROCEDURE — 90682 RIV4 VACC RECOMBINANT DNA IM: CPT | Performed by: PHYSICIAN ASSISTANT

## 2023-09-14 PROCEDURE — 90471 IMMUNIZATION ADMIN: CPT | Performed by: PHYSICIAN ASSISTANT

## 2023-09-14 PROCEDURE — 83036 HEMOGLOBIN GLYCOSYLATED A1C: CPT | Performed by: PHYSICIAN ASSISTANT

## 2023-09-14 RX ORDER — LISINOPRIL AND HYDROCHLOROTHIAZIDE 12.5; 2 MG/1; MG/1
1 TABLET ORAL DAILY
Qty: 90 TABLET | Refills: 1 | Status: SHIPPED | OUTPATIENT
Start: 2023-09-14 | End: 2024-03-28

## 2023-09-14 RX ORDER — LISINOPRIL 20 MG/1
20 TABLET ORAL DAILY
Qty: 90 TABLET | Refills: 1 | Status: CANCELLED | OUTPATIENT
Start: 2023-09-14

## 2023-09-14 RX ORDER — ASPIRIN 81 MG/1
81 TABLET ORAL DAILY
Qty: 90 TABLET | Refills: 1 | Status: SHIPPED | OUTPATIENT
Start: 2023-09-14 | End: 2024-03-25

## 2023-09-14 RX ORDER — GLYBURIDE-METFORMIN HYDROCHLORIDE 5; 500 MG/1; MG/1
2 TABLET ORAL 2 TIMES DAILY WITH MEALS
Qty: 360 TABLET | Refills: 1 | Status: SHIPPED | OUTPATIENT
Start: 2023-09-14 | End: 2024-03-28

## 2023-09-14 RX ORDER — PIOGLITAZONEHYDROCHLORIDE 30 MG/1
30 TABLET ORAL DAILY
Qty: 90 TABLET | Refills: 1 | Status: SHIPPED | OUTPATIENT
Start: 2023-09-14 | End: 2024-03-28

## 2023-09-14 RX ORDER — ATORVASTATIN CALCIUM 40 MG/1
40 TABLET, FILM COATED ORAL DAILY
Qty: 90 TABLET | Refills: 1 | Status: SHIPPED | OUTPATIENT
Start: 2023-09-14 | End: 2024-03-25

## 2023-09-14 ASSESSMENT — PAIN SCALES - GENERAL: PAINLEVEL: NO PAIN (0)

## 2023-09-14 NOTE — RESULT ENCOUNTER NOTE
Reviewed , best result on record and close to goal, signficant drop from 6 months ago, will continue current regimen.    Surinder Wilde PA-C

## 2023-09-14 NOTE — PROGRESS NOTES
Assessment & Plan  Add HCTZ  Problem List Items Addressed This Visit          Nervous and Auditory    Chronic pain of both knees    Relevant Medications    aspirin 81 MG EC tablet       Endocrine    Diabetes mellitus, type II (H) - Primary    Relevant Medications    glyBURIDE-metFORMIN (GLUCOVANCE) 5-500 MG tablet    pioglitazone (ACTOS) 30 MG tablet    aspirin 81 MG EC tablet    Other Relevant Orders    HEMOGLOBIN A1C    Hyperlipidemia LDL goal <100    Relevant Medications    atorvastatin (LIPITOR) 40 MG tablet       Circulatory    Essential hypertension with goal blood pressure less than 140/90    Relevant Medications    lisinopril-hydrochlorothiazide (ZESTORETIC) 20-12.5 MG tablet     Other Visit Diagnoses       Screen for colon cancer        Relevant Orders    Fecal colorectal cancer screen FIT - Future (S+30)    Need for prophylactic vaccination and inoculation against influenza                  16 minutes spent by me on the date of the encounter doing chart review, history and exam, documentation and further activities per the note       ALMA DELIA Cobb [facility]    Flower Grimm is a 62 year old, presenting for the following health issues:  Diabetes, Forms, and Imm/Inj (Flu Shot)        9/14/2023     2:39 PM   Additional Questions   Roomed by evelyn mckeon   Accompanied by none         9/14/2023     2:39 PM   Patient Reported Additional Medications   Patient reports taking the following new medications none       HPI     Diabetes Follow-up    How often are you checking your blood sugar? One time daily  What time of day are you checking your blood sugars (select all that apply)?  Before meals  Have you had any blood sugars above 200?  No  Have you had any blood sugars below 70?  No  What symptoms do you notice when your blood sugar is low?  None and Not applicable  What concerns do you have today about your diabetes? None and Other: her blood sugar is dropping   Do  "you have any of these symptoms? (Select all that apply)  Weight loss     service used over the phone.      Req renewall of disability sticker, states planning n knee surgery after nursing home.      BP Readings from Last 2 Encounters:   09/14/23 (!) 162/82   05/16/23 (!) 155/80     Hemoglobin A1C (%)   Date Value   02/28/2023 13.9 (H)   06/03/2022 10.4 (H)   11/30/2020 9.8 (H)   05/22/2020 8.4 (H)     LDL Cholesterol Calculated (mg/dL)   Date Value   04/17/2023 24   02/28/2023 26   05/22/2020 40   10/10/2018 145 (H)     LDL Cholesterol Direct (mg/dL)   Date Value   06/04/2019 125 (H)      Home BPs have been labile.        Review of Systems   ENDO hx DM as above      Objective    BP (!) 162/82   Pulse 91   Temp 98.5  F (36.9  C) (Tympanic)   Resp 18   Ht 1.516 m (4' 11.69\")   Wt 65.8 kg (145 lb)   LMP  (LMP Unknown)   SpO2 100%   BMI 28.61 kg/m    Body mass index is 28.61 kg/m .  Physical Exam   GENERAL: healthy, alert and no distress                     "

## 2023-09-14 NOTE — PATIENT INSTRUCTIONS
At [facility], we strive to deliver an exceptional experience to you, every time we see you. If you receive a survey, please complete it as we do value your feedback.  If you have MyChart, you can expect to receive results automatically within 24 hours of their completion.  Your provider will send a note interpreting your results as well.   If you do not have MyChart, you should receive your results in about a week by mail.    Your care team:                            Family Medicine Internal Medicine   MD Lenard Mera MD Shantel Branch-Fleming, MD Srinivasa Vaka, MD Katya Belousova, PARONNIE Coelho, MD Pediatrics   Surinder Wilde, PAMD Zofia Pryor MD Amelia Massimini APRN CNP   ISSAC Giron CNP, MD Charanya Pasupathi, MD Kathleen Widmer, NP coming October 2023 Same-Day (No follow up visit)    SERGEY Gómez PA coming Oct 2023     Clinic hours: Monday - Thursday 7 am-6 pm; Fridays 7 am-5 pm.   Urgent care: Monday - Friday 10 am- 8 pm; Saturday and Sunday 9 am-5 pm.    Clinic: [phone]       [facility]: Monday - Thursday 8 am - 7 pm; Friday 8 am - 6 pm  [facility]: [phone]

## 2023-10-26 ENCOUNTER — OFFICE VISIT (OUTPATIENT)
Dept: FAMILY MEDICINE | Facility: CLINIC | Age: 63
End: 2023-10-26
Payer: COMMERCIAL

## 2023-10-26 VITALS
TEMPERATURE: 98.2 F | WEIGHT: 145 LBS | BODY MASS INDEX: 28.47 KG/M2 | SYSTOLIC BLOOD PRESSURE: 136 MMHG | HEIGHT: 60 IN | RESPIRATION RATE: 18 BRPM | DIASTOLIC BLOOD PRESSURE: 76 MMHG | HEART RATE: 94 BPM | OXYGEN SATURATION: 100 %

## 2023-10-26 DIAGNOSIS — I10 ESSENTIAL HYPERTENSION WITH GOAL BLOOD PRESSURE LESS THAN 140/90: Primary | ICD-10-CM

## 2023-10-26 PROCEDURE — 99213 OFFICE O/P EST LOW 20 MIN: CPT | Mod: 25 | Performed by: PHYSICIAN ASSISTANT

## 2023-10-26 PROCEDURE — 90678 RSV VACC PREF BIVALENT IM: CPT | Performed by: PHYSICIAN ASSISTANT

## 2023-10-26 PROCEDURE — 90471 IMMUNIZATION ADMIN: CPT | Performed by: PHYSICIAN ASSISTANT

## 2023-10-26 ASSESSMENT — PAIN SCALES - GENERAL: PAINLEVEL: NO PAIN (0)

## 2023-10-26 NOTE — PATIENT INSTRUCTIONS
At [facility], we strive to deliver an exceptional experience to you, every time we see you. If you receive a survey, please complete it as we do value your feedback.  If you have MyChart, you can expect to receive results automatically within 24 hours of their completion.  Your provider will send a note interpreting your results as well.   If you do not have MyChart, you should receive your results in about a week by mail.    Your care team:                            Family Medicine Internal Medicine   MD Lenard Mera MD Shantel Branch-Fleming, MD Srinivasa Vaka, MD Katya Belousova, PARONNIE Coelho, MD Pediatrics   Surinder Wilde, PAMD Zofia Pryor MD Amelia Massimini APRN CNP   ISSAC Giron CNP, MD Charanya Pasupathi, MD Kathleen Widmer, NP coming October 2023 Same-Day (No follow up visit)    SERGEY Gómez PA coming Oct 2023     Clinic hours: Monday - Thursday 7 am-6 pm; Fridays 7 am-5 pm.   Urgent care: Monday - Friday 10 am- 8 pm; Saturday and Sunday 9 am-5 pm.    Clinic: [phone]       [facility]: Monday - Thursday 8 am - 7 pm; Friday 8 am - 6 pm  [facility]: [phone]

## 2023-10-26 NOTE — PROGRESS NOTES
"  Assessment & Plan  at goal  Problem List Items Addressed This Visit          Circulatory    Essential hypertension with goal blood pressure less than 140/90 - Primary          13 minutes spent by me on the date of the encounter doing chart review, history and exam, documentation and further activities per the note  {     ALMA DELIA Cobb  [facility] a 62 year [facility], presenting for the following health issues:  Hypertension        10/26/2023     3:33 PM   Additional Questions   Roomed by Estela   Accompanied by Self       HPI       Hypertension Follow-up    Do you check your blood pressure regularly outside of the clinic? Yes   Are you following a low salt diet? Yes  Are your blood pressures ever more than 140 on the top number (systolic) OR more   than 90 on the bottom number (diastolic), for example 140/90? No  How many servings of fruits and vegetables do you eat daily?  2-3  On average, how many sweetened beverages do you drink each day (Examples: soda, juice, sweet tea, etc.  Do NOT count diet or artificially sweetened beverages)?   0  How many days per week do you exercise enough to make your heart beat faster? 7  How many minutes a day do you exercise enough to make your heart beat faster? 10 - 19  How many days per week do you miss taking your medication? 0    Added hydrochlorothiazide at last appt     service used over the phone.          Review of Systems   CARDIO HTN as above      Objective    /76 (BP Location: Left arm, Patient Position: Chair, Cuff Size: Adult Large)   Pulse 94   Temp 98.2  F (36.8  C) (Tympanic)   Resp 18   Ht 1.516 m (4' 11.69\")   Wt 65.8 kg (145 lb)   LMP  (LMP Unknown)   SpO2 100%   BMI 28.61 kg/m    Body mass index is 28.61 kg/m .  Physical Exam   GENERAL: healthy, alert and no distress                     "

## 2023-11-10 ENCOUNTER — APPOINTMENT (OUTPATIENT)
Dept: INTERPRETER SERVICES | Facility: CLINIC | Age: 63
End: 2023-11-10
Payer: COMMERCIAL

## 2023-11-10 ENCOUNTER — TELEPHONE (OUTPATIENT)
Dept: NEPHROLOGY | Facility: CLINIC | Age: 63
End: 2023-11-10
Payer: COMMERCIAL

## 2023-11-10 NOTE — TELEPHONE ENCOUNTER
[facility] 5.16.24 [facility] Dr. Blank// 11.10.23 KET

## 2023-11-14 ENCOUNTER — TELEPHONE (OUTPATIENT)
Dept: MULTI SPECIALTY CLINIC | Facility: CLINIC | Age: 63
End: 2023-11-14
Payer: COMMERCIAL

## 2023-12-17 ENCOUNTER — HEALTH MAINTENANCE LETTER (OUTPATIENT)
Age: 63
End: 2023-12-17

## 2024-01-19 ENCOUNTER — OFFICE VISIT (OUTPATIENT)
Dept: FAMILY MEDICINE | Facility: CLINIC | Age: 64
End: 2024-01-19
Payer: COMMERCIAL

## 2024-01-19 VITALS
SYSTOLIC BLOOD PRESSURE: 134 MMHG | DIASTOLIC BLOOD PRESSURE: 73 MMHG | OXYGEN SATURATION: 100 % | TEMPERATURE: 98.3 F | WEIGHT: 138.6 LBS | HEART RATE: 99 BPM | BODY MASS INDEX: 27.94 KG/M2 | RESPIRATION RATE: 14 BRPM | HEIGHT: 59 IN

## 2024-01-19 DIAGNOSIS — E11.65 TYPE 2 DIABETES MELLITUS WITH HYPERGLYCEMIA, WITHOUT LONG-TERM CURRENT USE OF INSULIN (H): ICD-10-CM

## 2024-01-19 DIAGNOSIS — M17.0 PRIMARY OSTEOARTHRITIS OF BOTH KNEES: ICD-10-CM

## 2024-01-19 DIAGNOSIS — S83.241D TEAR OF MEDIAL MENISCUS OF RIGHT KNEE, UNSPECIFIED TEAR TYPE, UNSPECIFIED WHETHER OLD OR CURRENT TEAR, SUBSEQUENT ENCOUNTER: Primary | ICD-10-CM

## 2024-01-19 DIAGNOSIS — I10 ESSENTIAL HYPERTENSION WITH GOAL BLOOD PRESSURE LESS THAN 140/90: ICD-10-CM

## 2024-01-19 PROCEDURE — 99214 OFFICE O/P EST MOD 30 MIN: CPT | Performed by: INTERNAL MEDICINE

## 2024-01-19 ASSESSMENT — PAIN SCALES - GENERAL: PAINLEVEL: WORST PAIN (10)

## 2024-01-19 NOTE — PROGRESS NOTES
"  Assessment & Plan     Essential hypertension with goal blood pressure less than 140/90  Blood pressure under good control with the current regimen  Continue the same    Primary osteoarthritis of both knees  She has bad tri compartmental osteoarthritis  She was evaluated for this by orthopedics in the past   She wants to get a steroid injection  She is going to visit her mother in [place] soon and she is worried about the long flight journey and how it is going to exacerbate her osteoarthritis  She is leaving on 1/31 and wants to get a steroid injection before this  - Orthopedic  Referral; Future    Tear of medial meniscus of right knee, unspecified tear type, unspecified whether old or current tear, subsequent encounter  She has seen Dr. Hawk for this in the past  She does not want any surgical intervention but she is interested to get some steroid injection for pain relief  She wants a referral for the same  - Orthopedic  Referral; Future    Type 2 diabetes mellitus with hyperglycemia, without long-term current use of insulin (H)  She declined to get A1c today as she is needs to go to her job as soon as possible and has more time  She tells me her fasting sugars are in the range of 80-90  Advised to follow-up with her PCP and get an A1c soon  She is on prednisone and also a combination of glyburide and metformin  In this elderly lady I think glyburide is not a desired medication and I advised her that she probably can see her PCP and change this to something else    30 minutes spent by me on the date of the encounter doing chart review, history and exam, documentation and further activities per the note      BMI  Estimated body mass index is 27.72 kg/m  as calculated from the following:    Height as of this encounter: 1.506 m (4' 11.29\").    Weight as of this encounter: 62.9 kg (138 lb 9.6 oz).           Subjective   Grimm is a 63 year old, presenting for the following health " "issues:  Musculoskeletal Problem (Left leg, knees mainly)        1/19/2024     7:03 AM   Additional Questions   Roomed by Ann         1/19/2024     7:03 AM   Patient Reported Additional Medications   Patient reports taking the following new medications none     History of Present Illness       Reason for visit:  Knee/leg pain  Symptom onset:  1-2 weeks ago  Symptoms include:  Swelling when standing too long, knee pain, feet swelling  Symptom intensity:  Severe  Symptom progression:  Worsening  Had these symptoms before:  Yes  Has tried/received treatment for these symptoms:  Yes  Previous treatment was successful:  No  What makes it worse:  Standing too long  What makes it better:  Advil, resting    She eats 0-1 servings of fruits and vegetables daily.She consumes 1 sweetened beverage(s) daily.She exercises with enough effort to increase her heart rate 10 to 19 minutes per day.  She exercises with enough effort to increase her heart rate 7 days per week.   She is taking medications regularly.                 Review of Systems  Constitutional, HEENT, cardiovascular, pulmonary, gi and gu systems are negative, except as otherwise noted.      Objective    /73 (BP Location: Left arm, Patient Position: Sitting, Cuff Size: Adult Regular)   Pulse 99   Temp 98.3  F (36.8  C) (Oral)   Resp 14   Ht 1.506 m (4' 11.29\")   Wt 62.9 kg (138 lb 9.6 oz)   LMP  (LMP Unknown)   SpO2 100%   BMI 27.72 kg/m    Body mass index is 27.72 kg/m .  Physical Exam   GENERAL: alert and no distress  EYES: Eyes grossly normal to inspection, PERRL and conjunctivae and sclerae normal  MS: Both knees are slightly swollen  Left knee  is more swollen than the right  Crepitus heard in both knees  Flexion impaired in both knees  She only has about 90 degrees flexion on both knees  Also tenderness on palpation of the medial and lateral joint line  NEURO: Normal strength and tone, mentation intact and speech normal            Signed " Electronically by: Sanjeev Shah MD

## 2024-01-24 ENCOUNTER — OFFICE VISIT (OUTPATIENT)
Dept: ORTHOPEDICS | Facility: CLINIC | Age: 64
End: 2024-01-24
Attending: INTERNAL MEDICINE
Payer: COMMERCIAL

## 2024-01-24 VITALS
DIASTOLIC BLOOD PRESSURE: 77 MMHG | HEIGHT: 59 IN | SYSTOLIC BLOOD PRESSURE: 148 MMHG | BODY MASS INDEX: 27.82 KG/M2 | WEIGHT: 138 LBS

## 2024-01-24 DIAGNOSIS — G89.29 BILATERAL CHRONIC KNEE PAIN: Primary | ICD-10-CM

## 2024-01-24 DIAGNOSIS — M25.562 BILATERAL CHRONIC KNEE PAIN: Primary | ICD-10-CM

## 2024-01-24 DIAGNOSIS — M17.0 PRIMARY OSTEOARTHRITIS OF BOTH KNEES: ICD-10-CM

## 2024-01-24 DIAGNOSIS — M25.561 BILATERAL CHRONIC KNEE PAIN: Primary | ICD-10-CM

## 2024-01-24 PROCEDURE — 20611 DRAIN/INJ JOINT/BURSA W/US: CPT | Mod: 50 | Performed by: FAMILY MEDICINE

## 2024-01-24 PROCEDURE — 99204 OFFICE O/P NEW MOD 45 MIN: CPT | Mod: 25 | Performed by: FAMILY MEDICINE

## 2024-01-24 RX ADMIN — TRIAMCINOLONE ACETONIDE 40 MG: 40 INJECTION, SUSPENSION INTRA-ARTICULAR; INTRAMUSCULAR at 14:55

## 2024-01-24 RX ADMIN — ROPIVACAINE HYDROCHLORIDE 3 ML: 5 INJECTION, SOLUTION EPIDURAL; INFILTRATION; PERINEURAL at 14:55
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
## 2024-01-29 RX ORDER — TRIAMCINOLONE ACETONIDE 40 MG/ML
40 INJECTION, SUSPENSION INTRA-ARTICULAR; INTRAMUSCULAR
Status: SHIPPED | OUTPATIENT
Start: 2024-01-24

## 2024-01-29 RX ORDER — ROPIVACAINE HYDROCHLORIDE 5 MG/ML
3 INJECTION, SOLUTION EPIDURAL; INFILTRATION; PERINEURAL
Status: SHIPPED | OUTPATIENT
Start: 2024-01-24

## 2024-02-23 ENCOUNTER — TELEPHONE (OUTPATIENT)
Dept: FAMILY MEDICINE | Facility: CLINIC | Age: 64
End: 2024-02-23
Payer: COMMERCIAL

## 2024-02-23 NOTE — TELEPHONE ENCOUNTER
Patient Quality Outreach    Patient is due for the following:   Diabetes -  A1C    Next Steps:   Schedule a office visit for diabetes follow up    Type of outreach:    Sent letter.      Questions for provider review:    None           Carolyn Gore MA

## 2024-02-23 NOTE — LETTER
[address] [address]  [phone]  Dept: [phone]    February 23, 2024    Alfa Sung  [address] [address] [facility] [address]    Dear Alfa,    At [facility] we care about your health and are committed to providing quality patient care.     Here is a list of Health Maintenance topics that are due now or due soon:  Health Maintenance Due   Topic Date Due    DEXA  Never done    Pneumococcal Vaccine: 65+ Years (1 of 2 - PCV) Never done    ZOSTER IMMUNIZATION (1 of 2) Never done    COLORECTAL CANCER SCREENING  04/16/2019    COVID-19 Vaccine (5 - 2023-24 season) 09/01/2023    MEDICARE ANNUAL WELLNESS VISIT  Never done    A1C  12/14/2023    MAMMO SCREENING  02/20/2024    ALT  02/28/2024    DIABETIC FOOT EXAM  02/28/2024    ANNUAL REVIEW OF HM ORDERS  02/28/2024    EYE EXAM  03/22/2024        We are recommending that you:     Schedule a Diabetes Follow-Up Office Appointment: You are due to be seen with your primary care provider for a diabetes follow up office appointment. Please schedule this as soon as you are able.    To schedule an appointment or discuss this further, you may contact us by phone at the [facility] at [phone] or online through the patient portal/[email or web address] @ [email or web address]    Thank you for trusting [facility] and we appreciate the opportunity to serve you.  We look forward to supporting your healthcare needs in the future.    Your partners in health,      Quality Committee at [facility]

## 2024-02-25 ENCOUNTER — HEALTH MAINTENANCE LETTER (OUTPATIENT)
Age: 64
End: 2024-02-25

## 2024-03-25 DIAGNOSIS — E78.5 HYPERLIPIDEMIA LDL GOAL <100: ICD-10-CM

## 2024-03-25 DIAGNOSIS — E11.65 TYPE 2 DIABETES MELLITUS WITH HYPERGLYCEMIA, WITHOUT LONG-TERM CURRENT USE OF INSULIN (H): ICD-10-CM

## 2024-03-25 DIAGNOSIS — I10 ESSENTIAL HYPERTENSION WITH GOAL BLOOD PRESSURE LESS THAN 140/90: ICD-10-CM

## 2024-03-25 RX ORDER — HYDROGEN PEROXIDE 2.65 ML/100ML
81 LIQUID ORAL; TOPICAL DAILY
Qty: 90 TABLET | Refills: 1 | Status: SHIPPED | OUTPATIENT
Start: 2024-03-25 | End: 2024-09-23

## 2024-03-25 RX ORDER — ATORVASTATIN CALCIUM 40 MG/1
40 TABLET, FILM COATED ORAL DAILY
Qty: 90 TABLET | Refills: 1 | Status: SHIPPED | OUTPATIENT
Start: 2024-03-25 | End: 2024-05-13

## 2024-03-28 RX ORDER — PIOGLITAZONEHYDROCHLORIDE 30 MG/1
30 TABLET ORAL DAILY
Qty: 30 TABLET | Refills: 0 | Status: SHIPPED | OUTPATIENT
Start: 2024-03-28 | End: 2024-05-13

## 2024-03-28 RX ORDER — LISINOPRIL AND HYDROCHLOROTHIAZIDE 12.5; 2 MG/1; MG/1
1 TABLET ORAL DAILY
Qty: 30 TABLET | Refills: 0 | Status: SHIPPED | OUTPATIENT
Start: 2024-03-28 | End: 2024-05-13

## 2024-03-28 RX ORDER — GLYBURIDE-METFORMIN HYDROCHLORIDE 5; 500 MG/1; MG/1
2 TABLET ORAL 2 TIMES DAILY WITH MEALS
Qty: 120 TABLET | Refills: 0 | Status: SHIPPED | OUTPATIENT
Start: 2024-03-28 | End: 2024-05-13

## 2024-05-02 DIAGNOSIS — E11.65 TYPE 2 DIABETES MELLITUS WITH HYPERGLYCEMIA, WITHOUT LONG-TERM CURRENT USE OF INSULIN (H): ICD-10-CM

## 2024-05-02 DIAGNOSIS — I10 ESSENTIAL HYPERTENSION WITH GOAL BLOOD PRESSURE LESS THAN 140/90: ICD-10-CM

## 2024-05-03 RX ORDER — PIOGLITAZONEHYDROCHLORIDE 30 MG/1
30 TABLET ORAL DAILY
Qty: 30 TABLET | Refills: 0 | OUTPATIENT
Start: 2024-05-03

## 2024-05-03 RX ORDER — GLYBURIDE-METFORMIN HYDROCHLORIDE 5; 500 MG/1; MG/1
TABLET ORAL
Qty: 120 TABLET | Refills: 0 | OUTPATIENT
Start: 2024-05-03

## 2024-05-03 RX ORDER — LISINOPRIL AND HYDROCHLOROTHIAZIDE 12.5; 2 MG/1; MG/1
1 TABLET ORAL DAILY
Qty: 30 TABLET | Refills: 0 | OUTPATIENT
Start: 2024-05-03

## 2024-05-05 ENCOUNTER — HEALTH MAINTENANCE LETTER (OUTPATIENT)
Age: 64
End: 2024-05-05

## 2024-05-13 ENCOUNTER — OFFICE VISIT (OUTPATIENT)
Dept: FAMILY MEDICINE | Facility: CLINIC | Age: 64
End: 2024-05-13
Payer: COMMERCIAL

## 2024-05-13 VITALS
RESPIRATION RATE: 18 BRPM | OXYGEN SATURATION: 100 % | HEART RATE: 91 BPM | BODY MASS INDEX: 28.43 KG/M2 | SYSTOLIC BLOOD PRESSURE: 135 MMHG | TEMPERATURE: 97.7 F | WEIGHT: 141 LBS | DIASTOLIC BLOOD PRESSURE: 76 MMHG | HEIGHT: 59 IN

## 2024-05-13 DIAGNOSIS — I10 ESSENTIAL HYPERTENSION WITH GOAL BLOOD PRESSURE LESS THAN 140/90: ICD-10-CM

## 2024-05-13 DIAGNOSIS — N18.1 CHRONIC KIDNEY DISEASE, STAGE 1: ICD-10-CM

## 2024-05-13 DIAGNOSIS — E78.5 HYPERLIPIDEMIA LDL GOAL <100: ICD-10-CM

## 2024-05-13 DIAGNOSIS — E11.65 TYPE 2 DIABETES MELLITUS WITH HYPERGLYCEMIA, WITHOUT LONG-TERM CURRENT USE OF INSULIN (H): Primary | ICD-10-CM

## 2024-05-13 DIAGNOSIS — E11.69 TYPE 2 DIABETES MELLITUS WITH OTHER SPECIFIED COMPLICATION, WITHOUT LONG-TERM CURRENT USE OF INSULIN (H): ICD-10-CM

## 2024-05-13 DIAGNOSIS — E55.9 HYPOVITAMINOSIS D: ICD-10-CM

## 2024-05-13 DIAGNOSIS — E11.3313 MODERATE NONPROLIFERATIVE DIABETIC RETINOPATHY OF BOTH EYES WITH MACULAR EDEMA ASSOCIATED WITH TYPE 2 DIABETES MELLITUS (H): ICD-10-CM

## 2024-05-13 LAB — HBA1C MFR BLD: 10.2 % (ref 0–5.6)

## 2024-05-13 PROCEDURE — 80053 COMPREHEN METABOLIC PANEL: CPT | Performed by: PHYSICIAN ASSISTANT

## 2024-05-13 PROCEDURE — 99214 OFFICE O/P EST MOD 30 MIN: CPT | Performed by: PHYSICIAN ASSISTANT

## 2024-05-13 PROCEDURE — 80061 LIPID PANEL: CPT | Performed by: PHYSICIAN ASSISTANT

## 2024-05-13 PROCEDURE — 36415 COLL VENOUS BLD VENIPUNCTURE: CPT | Performed by: PHYSICIAN ASSISTANT

## 2024-05-13 PROCEDURE — 83036 HEMOGLOBIN GLYCOSYLATED A1C: CPT | Performed by: PHYSICIAN ASSISTANT

## 2024-05-13 PROCEDURE — 82306 VITAMIN D 25 HYDROXY: CPT | Performed by: PHYSICIAN ASSISTANT

## 2024-05-13 PROCEDURE — 82570 ASSAY OF URINE CREATININE: CPT | Performed by: PHYSICIAN ASSISTANT

## 2024-05-13 PROCEDURE — G2211 COMPLEX E/M VISIT ADD ON: HCPCS | Performed by: PHYSICIAN ASSISTANT

## 2024-05-13 PROCEDURE — 82043 UR ALBUMIN QUANTITATIVE: CPT | Performed by: PHYSICIAN ASSISTANT

## 2024-05-13 RX ORDER — GLYBURIDE-METFORMIN HYDROCHLORIDE 5; 500 MG/1; MG/1
2 TABLET ORAL 2 TIMES DAILY WITH MEALS
Qty: 360 TABLET | Refills: 1 | Status: SHIPPED | OUTPATIENT
Start: 2024-05-13

## 2024-05-13 RX ORDER — LISINOPRIL AND HYDROCHLOROTHIAZIDE 12.5; 2 MG/1; MG/1
1 TABLET ORAL DAILY
Qty: 90 TABLET | Refills: 3 | Status: SHIPPED | OUTPATIENT
Start: 2024-05-13

## 2024-05-13 RX ORDER — ATORVASTATIN CALCIUM 40 MG/1
40 TABLET, FILM COATED ORAL DAILY
Qty: 90 TABLET | Refills: 3 | Status: SHIPPED | OUTPATIENT
Start: 2024-05-13

## 2024-05-13 RX ORDER — PIOGLITAZONEHYDROCHLORIDE 30 MG/1
30 TABLET ORAL DAILY
Qty: 90 TABLET | Refills: 1 | Status: SHIPPED | OUTPATIENT
Start: 2024-05-13

## 2024-05-13 ASSESSMENT — PAIN SCALES - GENERAL: PAINLEVEL: NO PAIN (0)

## 2024-05-13 NOTE — PROGRESS NOTES
"  Assessment & Plan     Type 2 diabetes mellitus with hyperglycemia, without long-term current use of insulin (H)  Type 2 diabetes mellitus with other specified complication, without long-term current use of insulin (H)  Moderate nonproliferative diabetic retinopathy of both eyes with macular edema associated with type 2 diabetes mellitus (H)  Patient's home glucose have been well-controlled, unclear if she is actually having low values or just has not passed.  Past A1c 8.1.  If below 8, could consider stopping glyburide component.  If still elevated consider SGLT2.  Discussed the importance of yearly eye exam, states she will have her daughter make an appointment, has some established eye clinic.    Hyperlipidemia LDL goal <100  Checking lipid today.  - atorvastatin (LIPITOR) 40 MG tablet; Take 1 tablet (40 mg) by mouth daily for cholesterol    Chronic kidney disease, stage 1  - Albumin Random Urine Quantitative with Creat Ratio; Future  - Albumin Random Urine Quantitative with Creat Ratio    Essential hypertension with goal blood pressure less than 140/90  At goal.  - Comprehensive metabolic panel (BMP + Alb, Alk Phos, ALT, AST, Total. Bili, TP); Future  - lisinopril-hydrochlorothiazide (ZESTORETIC) 20-12.5 MG tablet; Take 1 tablet by mouth daily  - Comprehensive metabolic panel (BMP + Alb, Alk Phos, ALT, AST, Total. Bili, TP)    Hypovitaminosis D  Low last year, will recheck.  - Vitamin D Deficiency; Future  - Vitamin D Deficiency      Discussed preventative screening she is due for, not interested at this time.    Discussed follow-up in 3 months with PCP.      BMI  Estimated body mass index is 28.48 kg/m  as calculated from the following:    Height as of this encounter: 1.499 m (4' 11\").    Weight as of this encounter: 64 kg (141 lb).             Subjective   Alfa is a 63 year old, presenting for the following health issues:  Recheck Medication (Needs refills on diabetes meds)        5/13/2024     2:55 PM "   Additional Questions   Roomed by Carolyn   Accompanied by self         5/13/2024     2:55 PM   Patient Reported Additional Medications   Patient reports taking the following new medications none     History of Present Illness       Reason for visit:  Medication    She eats 2-3 servings of fruits and vegetables daily.She consumes 0 sweetened beverage(s) daily.She exercises with enough effort to increase her heart rate 10 to 19 minutes per day.  She exercises with enough effort to increase her heart rate 6 days per week.   She is taking medications regularly.         Diabetes Follow-up    How often are you checking your blood sugar? One time daily  What time of day are you checking your blood sugars (select all that apply)?  Before meals  Have you had any blood sugars above 200?  No  Have you had any blood sugars below 70?  No  What symptoms do you notice when your blood sugar is low?  None  What concerns do you have today about your diabetes? None and Other: need medication   Do you have any of these symptoms? (Select all that apply)  No numbness or tingling in feet.  No redness, sores or blisters on feet.  No complaints of excessive thirst.  No reports of blurry vision.  No significant changes to weight.  Have you had a diabetic eye exam in the last 12 months? No    States she just wants her medications filled, doing well.  Denies having any low values however also notes that she has a piece of chocolate if she is feeling low or sugars are in the 70s.   clarifying and patient again saying she does not have any low values.    BP Readings from Last 2 Encounters:   05/13/24 135/76   01/24/24 (!) 148/77     Hemoglobin A1C (%)   Date Value   09/14/2023 8.1 (H)   02/28/2023 13.9 (H)   11/30/2020 9.8 (H)   05/22/2020 8.4 (H)     LDL Cholesterol Calculated (mg/dL)   Date Value   04/17/2023 24   02/28/2023 26   05/22/2020 40   10/10/2018 145 (H)     LDL Cholesterol Direct (mg/dL)   Date Value   06/04/2019 125  "(H)         How many servings of fruits and vegetables do you eat daily?  0-1  On average, how many sweetened beverages do you drink each day (Examples: soda, juice, sweet tea, etc.  Do NOT count diet or artificially sweetened beverages)?   0  How many days per week do you exercise enough to make your heart beat faster? 6  How many minutes a day do you exercise enough to make your heart beat faster? 10 - 19  How many days per week do you miss taking your medication? 0            Objective    /76 (BP Location: Left arm, Patient Position: Sitting, Cuff Size: Adult Regular)   Pulse 91   Temp 97.7  F (36.5  C) (Oral)   Resp 18   Ht 1.499 m (4' 11\")   Wt 64 kg (141 lb)   LMP  (LMP Unknown)   SpO2 100%   BMI 28.48 kg/m    Body mass index is 28.48 kg/m .  Physical Exam   GENERAL: alert and no distress  EYES: Eyes grossly normal to inspection, PERRL and conjunctivae and sclerae normal  RESP: lungs clear to auscultation - no rales, rhonchi or wheezes  CV: regular rate and rhythm, normal S1 S2, no S3 or S4, no murmur, click or rub, no peripheral edema  MS: no gross musculoskeletal defects noted, no edema  SKIN: Dark black macule to lateral left foot approximately 2 mm in diameter, patient notes it has been there her entire life.  NEURO: Normal strength and tone, mentation intact and speech normal  PSYCH: mentation appears normal, affect normal/bright  Diabetic foot exam: normal DP and PT pulses, no trophic changes or ulcerative lesions, and normal sensory exam            Signed Electronically by: Beverly Jean PA-C    "

## 2024-05-14 ENCOUNTER — TELEPHONE (OUTPATIENT)
Dept: FAMILY MEDICINE | Facility: CLINIC | Age: 64
End: 2024-05-14
Payer: COMMERCIAL

## 2024-05-14 ENCOUNTER — VIRTUAL VISIT (OUTPATIENT)
Dept: INTERPRETER SERVICES | Facility: CLINIC | Age: 64
End: 2024-05-14
Payer: COMMERCIAL

## 2024-05-14 DIAGNOSIS — E11.65 TYPE 2 DIABETES MELLITUS WITH HYPERGLYCEMIA, WITHOUT LONG-TERM CURRENT USE OF INSULIN (H): Primary | ICD-10-CM

## 2024-05-14 LAB
ALBUMIN SERPL BCG-MCNC: 4.6 G/DL (ref 3.5–5.2)
ALP SERPL-CCNC: 58 U/L (ref 40–150)
ALT SERPL W P-5'-P-CCNC: 20 U/L (ref 0–50)
ANION GAP SERPL CALCULATED.3IONS-SCNC: 8 MMOL/L (ref 7–15)
AST SERPL W P-5'-P-CCNC: 19 U/L (ref 0–45)
BILIRUB SERPL-MCNC: 0.3 MG/DL
BUN SERPL-MCNC: 23.6 MG/DL (ref 8–23)
CALCIUM SERPL-MCNC: 9.6 MG/DL (ref 8.8–10.2)
CHLORIDE SERPL-SCNC: 97 MMOL/L (ref 98–107)
CHOLEST SERPL-MCNC: 123 MG/DL
CREAT SERPL-MCNC: 1.02 MG/DL (ref 0.51–0.95)
CREAT UR-MCNC: 46.5 MG/DL
DEPRECATED HCO3 PLAS-SCNC: 29 MMOL/L (ref 22–29)
EGFRCR SERPLBLD CKD-EPI 2021: 61 ML/MIN/1.73M2
FASTING STATUS PATIENT QL REPORTED: NO
FASTING STATUS PATIENT QL REPORTED: NO
GLUCOSE SERPL-MCNC: 425 MG/DL (ref 70–99)
HDLC SERPL-MCNC: 63 MG/DL
LDLC SERPL CALC-MCNC: 41 MG/DL
MICROALBUMIN UR-MCNC: <12 MG/L
MICROALBUMIN/CREAT UR: NORMAL MG/G{CREAT}
NONHDLC SERPL-MCNC: 60 MG/DL
POTASSIUM SERPL-SCNC: 5.5 MMOL/L (ref 3.4–5.3)
PROT SERPL-MCNC: 7 G/DL (ref 6.4–8.3)
SODIUM SERPL-SCNC: 134 MMOL/L (ref 135–145)
TRIGL SERPL-MCNC: 97 MG/DL
VIT D+METAB SERPL-MCNC: 24 NG/ML (ref 20–50)

## 2024-05-14 NOTE — TELEPHONE ENCOUNTER
RN called patient with Yoruba  and relayed message below from provider:    ----- Message from Beverly Jean PA-C sent at 5/14/2024  8:55 AM CDT -----    Please call patient with results. Her A1c came back quite high at 10.2. I would recommend starting a new medication for this. I sent Jardiance, which is going for prior authorization now. This medication works but helping to urinate out extra sugar. It has shown to be protective of the kidneys as well. She needs to come in 3 months to have a recheck of her A1c. Her potassium came back high, but her sugars were high as well which can cause this. This test will need to be rechecked as well.    ________________________________      RN provided education for new medication (see clinical reference document below for details discussed) including administration, side effects, and when to call clinic. Patient verbalized understanding.    empagliflozin (JARDIANCE) 10 MG TABS tablet   Sig - Route: Take 1 tablet (10 mg) by mouth daily - Oral     RN offered to schedule patient for follow up visit to recheck labs. She declined at this time and will call back.     Chelsie Rodriguez, RN, BSN, PHN  [facility]  Nurse Triage, Family Practice    ________________________________      Empagliflozin Oral Tablet (EMPAGLIFLOZIN - ORAL)  This medicine is used for the following purposes:  diabetes  heart failure  kidney disease  Brand Name(s): Jardiance  Generic Name: Empagliflozin  Instructions  This medicine may be taken with or without food.  This medicine will work best if you take it at about the same time every day.  Store at room temperature away from heat, light, and moisture. Do not keep in the bathroom.  Drink plenty of water while on this medicine.  Tell your doctor if you have severe or persistent sweating, diarrhea or vomiting. These can increase your risk of a serious side effect.  It is important that you keep taking each dose of  this medicine on time even if you are feeling well.  If you forget to take a dose on time, take it as soon as you remember. If it is almost time for the next dose, do not take the missed dose. Return to your normal schedule. Do not take 2 doses at one time.  Drug interactions can change how medicines work or increase risk for side effects. Tell your health care providers about all medicines taken. Include prescription and over-the-counter medicines, vitamins, and herbal medicines. Speak with your doctor or pharmacist before starting or stopping any medicine.  Tell your doctor if symptoms do not get better or if they get worse.  This medicine may cause low blood sugar. Eat regular meals and exercise as instructed by your doctor. Tell your doctor if you have symptoms of low blood sugar such as nausea, sweating, cold skin, fast heartbeat, hunger, and irritability.  Keep all appointments for medical exams and tests while on this medicine.  Cautions  Tell your doctor and pharmacist if you ever had an allergic reaction to a medicine.  This medicine is associated with a rare but very serious medical condition. Please speak with your doctor about symptoms you should look out for while on this medicine. Notify your doctor immediately if you develop those symptoms.  This medicine may rarely increase risk of losing a lower limb. This may result in loss of a toe, foot, or leg. Please speak with your doctor whether you are at risk. If you notice any pain, redness, swelling, or sores on your leg or foot, contact your doctor right away.  Do not use the medication any more than instructed.  This medicine may cause dizziness or fainting, especially after exercising or in hot weather. Be very careful when standing or sitting up quickly.  Your ability to stay alert or to react quickly may be impaired by this medicine. Do not drive or operate machinery until you know how this medicine will affect you.  If you drink more than a few  alcoholic beverages each day, ask your doctor whether you should be on this medicine.  Avoid becoming overheated during exercise or other activities. Try to stay cool in hot weather.  Tell the doctor or pharmacist if you are pregnant, planning to be pregnant, or breastfeeding.  Do not breastfeed while on this medicine.  Do not share this medicine with anyone who has not been prescribed this medicine.  Some patients have serious side effects from this medicine. Ask your pharmacist to show you the information from the Food and Drug Administration (FDA) and discuss it with you.  Always refill this medicine before it runs out.  Side Effects  The following is a list of some common side effects from this medicine. Please speak with your doctor about what you should do if you experience these or other side effects.  dizziness  increased urinary frequency  waking up at night to urinate  Call your doctor or get medical help right away if you notice any of these more serious side effects:  blurry vision  dry mouth  fainting  numbness or tingling in hands and feet  fast or irregular heart beats  nausea and vomiting  arm, hand, leg or foot pain  shakiness  shortness of breath  stomach pain  persistent or unusual thirst  unusual or unexplained tiredness or weakness  difficulty or discomfort urinating  blood in urine  vaginal itching or discharge  vaginal itching or yeast infection  yeast infection of the penis (redness, itching, swelling or discharge)  A few people may have an allergic reaction to this medicine. Symptoms can include difficulty breathing, skin rash, itching, swelling, or severe dizziness. If you notice any of these symptoms, seek medical help quickly.  Please speak with your doctor, nurse, or pharmacist if you have any questions about this medicine.  IMPORTANT NOTE: This document tells you briefly how to take your medicine, but it does not tell you all there is to know about it. Your doctor or pharmacist may give  you other documents about your medicine. Please talk to them if you have any questions. Always follow their advice.  There is a more complete description of this medicine available in English. Scan this code on your smartphone or tablet or use the web address below. You can also ask your pharmacist for a printout. If you have any questions, please ask your pharmacist.  The display and use of this drug information is subject to Terms of Use.  More information about EMPAGLIFLOZIN - ORAL      Copyright(c) 2023 [employer]  Selected from data included with permission and copyright by [employer]. This copyrighted material has been downloaded from a licensed data provider and is not for distribution, except as may be authorized by the applicable terms of use.  Conditions of Use: The information in this database is intended to supplement, not substitute for the expertise and judgment of healthcare professionals. The information is not intended to cover all possible uses, directions, precautions, drug interactions or adverse effects nor should it be construed to indicate that use of a particular drug is safe, appropriate or effective for you or anyone else. A healthcare professional should be consulted before taking any drug, changing any diet or commencing or discontinuing any course of treatment. The display and use of this drug information is subject to express Terms of Use.  Care instructions adapted under license by your healthcare professional. If you have questions about a medical condition or this instruction, always ask your healthcare professional. [employer], [employer] disclaims any warranty or liability for your use of this information.

## 2024-09-22 ENCOUNTER — HEALTH MAINTENANCE LETTER (OUTPATIENT)
Age: 64
End: 2024-09-22

## 2024-09-22 DIAGNOSIS — E11.65 TYPE 2 DIABETES MELLITUS WITH HYPERGLYCEMIA, WITHOUT LONG-TERM CURRENT USE OF INSULIN (H): ICD-10-CM

## 2024-09-23 RX ORDER — HYDROGEN PEROXIDE 2.65 ML/100ML
81 LIQUID ORAL; TOPICAL DAILY
Qty: 90 TABLET | Refills: 0 | Status: SHIPPED | OUTPATIENT
Start: 2024-09-23

## 2024-10-15 DIAGNOSIS — E11.65 TYPE 2 DIABETES MELLITUS WITH HYPERGLYCEMIA, WITHOUT LONG-TERM CURRENT USE OF INSULIN (H): ICD-10-CM

## 2024-10-15 RX ORDER — GLYBURIDE-METFORMIN HYDROCHLORIDE 5; 500 MG/1; MG/1
2 TABLET ORAL 2 TIMES DAILY WITH MEALS
Qty: 360 TABLET | Refills: 0 | OUTPATIENT
Start: 2024-10-15

## 2024-10-15 RX ORDER — GLYBURIDE-METFORMIN HYDROCHLORIDE 5; 500 MG/1; MG/1
2 TABLET ORAL 2 TIMES DAILY WITH MEALS
Qty: 360 TABLET | Refills: 0 | Status: SHIPPED | OUTPATIENT
Start: 2024-10-15 | End: 2024-10-29

## 2024-10-15 NOTE — TELEPHONE ENCOUNTER
She needs to establish care with a new primary for further fills. I can refill once if an appointment is made.

## 2024-10-15 NOTE — TELEPHONE ENCOUNTER
This writer attempted to contact patient. on 10/15/24      Reason for call medication and left message.      If patient calls back:   Registered Nurse called.      Medication was sent tis morning to pharmacy. Attempted to call pt to determine if they were able to pick it up.    Jeanette Gray, POORNIMAN, RN  [facility]

## 2024-10-15 NOTE — TELEPHONE ENCOUNTER
Called and let them know that Rx was sent to the pharmacy and to keep the 10/29/2024 appt.  Felicita Tyler MA  [facility]   Primary Care

## 2024-10-15 NOTE — TELEPHONE ENCOUNTER
Is patient actually out of this? Does the pharmacy know if a nearby [facility] has this and can transfer script?

## 2024-10-16 ENCOUNTER — APPOINTMENT (OUTPATIENT)
Dept: INTERPRETER SERVICES | Facility: CLINIC | Age: 64
End: 2024-10-16
Payer: COMMERCIAL

## 2024-10-16 NOTE — TELEPHONE ENCOUNTER
This writer attempted to contact pt on 10/16/24      Reason for call: verify if pt picked up prescription and left message.      If patient calls back:    If pt calls back, inform that Glyburide-Metformin is available for pickup at [facility] pharmacy in [place] on 10/17/2024.     Ask if they want medication to be sent to a new pharmacy due to supply issues at [facility].     Lizeth Early RN

## 2024-10-17 RX ORDER — GLYBURIDE-METFORMIN HYDROCHLORIDE 5; 500 MG/1; MG/1
2 TABLET ORAL 2 TIMES DAILY WITH MEALS
Qty: 360 TABLET | Refills: 0 | OUTPATIENT
Start: 2024-10-17

## 2024-10-29 ENCOUNTER — OFFICE VISIT (OUTPATIENT)
Dept: FAMILY MEDICINE | Facility: CLINIC | Age: 64
End: 2024-10-29
Payer: COMMERCIAL

## 2024-10-29 VITALS
DIASTOLIC BLOOD PRESSURE: 74 MMHG | HEIGHT: 59 IN | BODY MASS INDEX: 29.48 KG/M2 | TEMPERATURE: 97.8 F | OXYGEN SATURATION: 99 % | SYSTOLIC BLOOD PRESSURE: 123 MMHG | WEIGHT: 146.2 LBS | RESPIRATION RATE: 16 BRPM | HEART RATE: 100 BPM

## 2024-10-29 DIAGNOSIS — Z79.4 TYPE 2 DIABETES MELLITUS WITH STAGE 1 CHRONIC KIDNEY DISEASE, WITH LONG-TERM CURRENT USE OF INSULIN (H): Primary | ICD-10-CM

## 2024-10-29 DIAGNOSIS — Z12.11 SCREEN FOR COLON CANCER: ICD-10-CM

## 2024-10-29 DIAGNOSIS — E11.3313 MODERATE NONPROLIFERATIVE DIABETIC RETINOPATHY OF BOTH EYES WITH MACULAR EDEMA ASSOCIATED WITH TYPE 2 DIABETES MELLITUS (H): ICD-10-CM

## 2024-10-29 DIAGNOSIS — E78.5 HYPERLIPIDEMIA LDL GOAL <100: ICD-10-CM

## 2024-10-29 DIAGNOSIS — E11.22 TYPE 2 DIABETES MELLITUS WITH STAGE 1 CHRONIC KIDNEY DISEASE, WITH LONG-TERM CURRENT USE OF INSULIN (H): Primary | ICD-10-CM

## 2024-10-29 DIAGNOSIS — N18.1 TYPE 2 DIABETES MELLITUS WITH STAGE 1 CHRONIC KIDNEY DISEASE, WITH LONG-TERM CURRENT USE OF INSULIN (H): Primary | ICD-10-CM

## 2024-10-29 DIAGNOSIS — Z23 NEED FOR PNEUMOCOCCAL VACCINATION: ICD-10-CM

## 2024-10-29 DIAGNOSIS — Z23 NEED FOR TETANUS BOOSTER: ICD-10-CM

## 2024-10-29 DIAGNOSIS — I10 ESSENTIAL HYPERTENSION WITH GOAL BLOOD PRESSURE LESS THAN 140/90: ICD-10-CM

## 2024-10-29 DIAGNOSIS — Z12.31 VISIT FOR SCREENING MAMMOGRAM: ICD-10-CM

## 2024-10-29 LAB
EST. AVERAGE GLUCOSE BLD GHB EST-MCNC: 212 MG/DL
HBA1C MFR BLD: 9 % (ref 0–5.6)

## 2024-10-29 PROCEDURE — 36415 COLL VENOUS BLD VENIPUNCTURE: CPT | Performed by: NURSE PRACTITIONER

## 2024-10-29 PROCEDURE — 90715 TDAP VACCINE 7 YRS/> IM: CPT | Performed by: NURSE PRACTITIONER

## 2024-10-29 PROCEDURE — 90677 PCV20 VACCINE IM: CPT | Performed by: NURSE PRACTITIONER

## 2024-10-29 PROCEDURE — 83036 HEMOGLOBIN GLYCOSYLATED A1C: CPT | Performed by: NURSE PRACTITIONER

## 2024-10-29 PROCEDURE — 90472 IMMUNIZATION ADMIN EACH ADD: CPT | Performed by: NURSE PRACTITIONER

## 2024-10-29 PROCEDURE — 99214 OFFICE O/P EST MOD 30 MIN: CPT | Mod: 25 | Performed by: NURSE PRACTITIONER

## 2024-10-29 PROCEDURE — 90471 IMMUNIZATION ADMIN: CPT | Performed by: NURSE PRACTITIONER

## 2024-10-29 PROCEDURE — T1013 SIGN LANG/ORAL INTERPRETER: HCPCS | Mod: U3

## 2024-10-29 RX ORDER — GLYBURIDE-METFORMIN HYDROCHLORIDE 5; 500 MG/1; MG/1
2 TABLET ORAL 2 TIMES DAILY WITH MEALS
Qty: 360 TABLET | Refills: 2 | Status: SHIPPED | OUTPATIENT
Start: 2024-10-29

## 2024-10-29 RX ORDER — LISINOPRIL AND HYDROCHLOROTHIAZIDE 12.5; 2 MG/1; MG/1
1 TABLET ORAL DAILY
Qty: 90 TABLET | Refills: 3 | Status: SHIPPED | OUTPATIENT
Start: 2024-10-29

## 2024-10-29 RX ORDER — ATORVASTATIN CALCIUM 40 MG/1
40 TABLET, FILM COATED ORAL DAILY
Qty: 90 TABLET | Refills: 3 | Status: SHIPPED | OUTPATIENT
Start: 2024-10-29

## 2024-10-29 RX ORDER — ASPIRIN 81 MG/1
81 TABLET ORAL DAILY
Qty: 90 TABLET | Refills: 3 | Status: SHIPPED | OUTPATIENT
Start: 2024-10-29

## 2024-10-29 RX ORDER — PIOGLITAZONE 30 MG/1
30 TABLET ORAL DAILY
Qty: 90 TABLET | Refills: 3 | Status: SHIPPED | OUTPATIENT
Start: 2024-10-29

## 2024-10-29 NOTE — PROGRESS NOTES
Assessment & Plan     Type 2 diabetes mellitus with hyperglycemia, without long-term current use of insulin (H)  A1c elevated though improved from last check.  Patient declines insulin.  Continue on the same regimen for now, emphasized the need to monitor her dietary intake and increase activity.  Should follow-up in 3 months and if A1c not improved further, we will make changes to current regimen.  Routine diabetic retinopathy screening: referral placed  Foot exam,  2+ pulses. No ulcers.No complaints of foot pain or paresthesias  Increase dietary efforts and physical activity.   - HEMOGLOBIN A1C; Future  - pioglitazone (ACTOS) 30 MG tablet; Take 1 tablet (30 mg) by mouth daily.  - aspirin (EQ ASPIRIN ADULT LOW DOSE) 81 MG EC tablet; Take 1 tablet (81 mg) by mouth daily.  - empagliflozin (JARDIANCE) 10 MG TABS tablet; Take 1 tablet (10 mg) by mouth daily.  - glyBURIDE-metFORMIN (GLUCOVANCE) 5-500 MG tablet; Take 2 tablets by mouth 2 times daily (with meals).  - PRIMARY CARE FOLLOW-UP SCHEDULING; Future    Moderate nonproliferative diabetic retinopathy of both eyes with macular edema associated with type 2 diabetes mellitus (H)   - OPTOMETRY REFERRAL; Future    Essential hypertension with goal blood pressure less than 140/90  Controlled, compliant with medications. Does not report any headaches, blurry vision, dizziness, chest pain, shortness of breath, or palpitations.  Continue current medications. No change in management. Discussed DASH diet and dietary sodium restrictions. Increase dietary efforts and physical activity.  - lisinopril-hydrochlorothiazide (ZESTORETIC) 20-12.5 MG tablet; Take 1 tablet by mouth daily.    Visit for screening mammogram  - MA Screening Bilateral w/ Dominick; Future    Hyperlipidemia LDL goal <100  - atorvastatin (LIPITOR) 40 MG tablet; Take 1 tablet (40 mg) by mouth daily. for cholesterol    Need for tetanus booster  - TDAP 10-64Y (ADACEL,BOOSTRIX)    Screen for colon cancer  - Fecal  "colorectal cancer screen FIT - Future (S+30)    Need for pneumococcal vaccination  - Pneumococcal 20 Valent Conjugate (PCV20)    BMI  Estimated body mass index is 29.53 kg/m  as calculated from the following:    Height as of this encounter: 1.499 m (4' 11\").    Weight as of this encounter: 66.3 kg (146 lb 3.2 oz).             Flower Grimm is a 63 year old, presenting for the following health issues:  Depression and Handicap renewal       10/29/2024     2:55 PM   Additional Questions   Roomed by Anamaria   Accompanied by Self         10/29/2024     2:55 PM   Patient Reported Additional Medications   Patient reports taking the following new medications No     Via the Health Maintenance questionnaire, the patient has reported the following services have been completed -Colonscopy: had it done in [facility] 2022-03-12, this information has been sent to the abstraction team.  Via the Health Maintenance questionnaire, the patient has reported the following services have been completed -Mammogram: [facility] 2022-02-20, this information has not been sent to the abstraction team.  History of Present Illness       Diabetes:   She presents for follow up of diabetes.  She is checking home blood glucose one time daily.   She checks blood glucose before meals.  Blood glucose is never over 200 and never under 70.  When her blood glucose is low, the patient is asymptomatic for confusion, blurred vision, lethargy and reports not feeling dizzy, shaky, or weak.   She has no concerns regarding her diabetes at this time.   She is not experiencing numbness or burning in feet, excessive thirst, blurry vision, weight changes or redness, sores or blisters on feet. The patient has had a diabetic eye exam in the last 12 months. Eye exam performed on march 22 2023. Location of last eye exam [facility].        She eats 2-3 servings of fruits and vegetables daily.She consumes 0 sweetened beverage(s) daily.She exercises with enough effort to " "increase her heart rate 10 to 19 minutes per day.  She exercises with enough effort to increase her heart rate 7 days per week.   She is taking medications regularly.                 Review of Systems  Constitutional, neuro, ENT, endocrine, pulmonary, cardiac, gastrointestinal, genitourinary, musculoskeletal, integument and psychiatric systems are negative, except as otherwise noted.      Objective    /74 (BP Location: Left arm, Patient Position: Sitting, Cuff Size: Adult Regular)   Pulse 100   Temp 97.8  F (36.6  C) (Temporal)   Resp 16   Ht 1.499 m (4' 11\")   Wt 66.3 kg (146 lb 3.2 oz)   LMP  (LMP Unknown)   SpO2 99%   BMI 29.53 kg/m    Body mass index is 29.53 kg/m .  Physical Exam   GENERAL: alert and no distress  NECK: no adenopathy, no asymmetry, masses, or scars  RESP: lungs clear to auscultation - no rales, rhonchi or wheezes  CV: regular rate and rhythm, normal S1 S2, no S3 or S4, no murmur, click or rub, no peripheral edema  ABDOMEN: soft, nontender, no hepatosplenomegaly, no masses and bowel sounds normal  MS: no gross musculoskeletal defects noted, no edema  NEURO: Normal strength and tone, mentation intact and speech normal  Foot exam: 2+ pulses. No ulcers.No complaints of foot pain or paresthesias          Signed Electronically by: ISSAC Chaparro CNP    "

## 2024-10-30 ENCOUNTER — TELEPHONE (OUTPATIENT)
Dept: FAMILY MEDICINE | Facility: CLINIC | Age: 64
End: 2024-10-30
Payer: COMMERCIAL

## 2024-10-30 ENCOUNTER — PATIENT OUTREACH (OUTPATIENT)
Dept: CARE COORDINATION | Facility: CLINIC | Age: 64
End: 2024-10-30
Payer: COMMERCIAL

## 2024-10-30 DIAGNOSIS — E11.65 TYPE 2 DIABETES MELLITUS WITH HYPERGLYCEMIA, WITHOUT LONG-TERM CURRENT USE OF INSULIN (H): Primary | ICD-10-CM

## 2024-10-30 NOTE — TELEPHONE ENCOUNTER
Plan does not cover empagliflozin (JARDIANCE) 10 MG TABS tablet .      Please go to [email or web address] to initiate Prior Auth or switch to alternative medication.    Insurance type and ID number: ID#[id number]      Additional Information: [phone]    Josie Fay

## 2024-10-30 NOTE — TELEPHONE ENCOUNTER
Called patient and relayed provider message below, patient verbalized understanding.     Patient states that she isn't sure what medication she hasn't taken - states she never started it as it wasn't covered by insurance, but does think it's a diabetes medication. Does not have medication bottles with her (is not at home). Writer reviewed chart and noted other TE today re: need for PA on Jardiance, likely medication that patient hasn't been taking but cannot say with guarantee as patient is not with medications.     Patient requesting alternative medication if able. Declines assistance with appt scheduling for 3 month A1C recheck.      Routing to provider to review/advise      Caro Packer, RN, BSN  [facility]  _______________________________________    Please call with .  Please also have patient go over what medications she is taking for her diabetes because she was reporting to me that she skipped one of her meds because of the price but could not tell me which one.     Edy Sung    A1c is a test which indicates how well your blood sugar has been controlled over the last 6 weeks.  This indicates that your average blood sugar over the last 6 weeks is 212.  Your A1c is still elevated though it has improved.  The goal for diabetics without other complications is less than 7. You should recheck your A1c in 3 months. In the meantime, a healthy diet high in lean protein, whole grain carbohydrates and healthy fats such as olive oil or canola will help mainain a healthy blood sugar.     Please contact us with any questions.     Hamida Modi, DNP, CNP, APRN

## 2024-10-30 NOTE — TELEPHONE ENCOUNTER
Called patient via  and relayed provider message below, patient verbalized understanding. MT scheduling number given, no further questions or concerns.      Caro Packer, RN, BSN  [facility]

## 2024-10-30 NOTE — TELEPHONE ENCOUNTER
Order placed for [facility] pharmacy. Patient need to bring all her med bottles with to the appointment.    ISSAC Chaparro CNP on 10/30/2024 at 1:33 PM

## 2024-10-31 NOTE — TELEPHONE ENCOUNTER
Retail Pharmacy Prior Authorization Team   Phone: [phone]    PA Initiation    Medication: JARDIANCE 10 MG PO TABS  Insurance Company: Other (see comments)  Pharmacy Filling the Rx: [facility] [id number] [address] [address]  Filling Pharmacy Phone:    Filling Pharmacy Fax:    Start Date: 10/31/2024

## 2024-11-01 ENCOUNTER — TELEPHONE (OUTPATIENT)
Dept: FAMILY MEDICINE | Facility: CLINIC | Age: 64
End: 2024-11-01
Payer: COMMERCIAL

## 2024-11-01 ENCOUNTER — PATIENT OUTREACH (OUTPATIENT)
Dept: CARE COORDINATION | Facility: CLINIC | Age: 64
End: 2024-11-01
Payer: COMMERCIAL

## 2024-11-01 NOTE — TELEPHONE ENCOUNTER
Retail Pharmacy Prior Authorization Team   Phone: [phone]    Prior Authorization Approval    Medication: JARDIANCE 10 MG PO TABS  Authorization Effective Date: 11/1/2024  Authorization Expiration Date: 3/1/2025  Reference #: [id number]   Insurance Company: Other (see comments)  Which Pharmacy is filling the prescription: [id number] - [place], [place] - [address]  Pharmacy Notified: YES  Patient Notified: YES **Instructed pharmacy to notify patient when script is ready to /ship.**

## 2024-11-01 NOTE — TELEPHONE ENCOUNTER
PA approved under the condition that the patient has their A1c tested at least quarterly. Authorization is for 4 months and updated chart notes and A1c will be required after the 4 month period. Please inform the patient and make appropriate appointments.  Please close this encounter when finished.  Thank you!  Denisse Lopez [facility] Team       Called pt and relayed message. Pt understands.

## 2024-11-01 NOTE — TELEPHONE ENCOUNTER
Forms/Letter Request    Type of form/letter: Application For Disability Parking Certificate form    Do we have the form/letter: Yes: Received provider signed form    Who is the form from? Patient-brought to their appt 10/29/2024    Where did/will the form come from? Patient or family brought in       When is form/letter needed by: Not indicated    How would you like the form/letter returned:  Not indicated. Will have to call the patient    Called with a [employer]  #[id number] and let the patient know the form has been completed and will be available for  with Id after 10 am today, 11/1/2024. Patient understands. Copy of the form to TC and abstracting.  Bringing form to the  by 10 am.  Felicita Tyler [facility]   Primary Care

## 2024-11-04 ENCOUNTER — TELEPHONE (OUTPATIENT)
Dept: PHARMACY | Facility: CLINIC | Age: 64
End: 2024-11-04
Payer: COMMERCIAL

## 2024-11-04 NOTE — TELEPHONE ENCOUNTER
[facility] referral from: [facility] clinic visit (referral by provider)    [facility] referral outreach attempt #2 on November 4, 2024 at 9:41 AM      Outcome: Patient not reachable after several attempts, sent [email or web address] message    Use private pay, [employer] for the carrier/Plan on the flowsheet      [employer] Message Sent    Nadja Tobin [facility]  -[facility]  [phone]

## 2025-02-09 ENCOUNTER — HEALTH MAINTENANCE LETTER (OUTPATIENT)
Age: 65
End: 2025-02-09

## 2025-03-09 ENCOUNTER — HEALTH MAINTENANCE LETTER (OUTPATIENT)
Age: 65
End: 2025-03-09

## 2025-05-17 ENCOUNTER — HEALTH MAINTENANCE LETTER (OUTPATIENT)
Age: 65
End: 2025-05-17

## 2025-06-03 ENCOUNTER — TELEPHONE (OUTPATIENT)
Dept: FAMILY MEDICINE | Facility: CLINIC | Age: 65
End: 2025-06-03
Payer: COMMERCIAL

## 2025-06-03 NOTE — TELEPHONE ENCOUNTER
Patient Quality Outreach    Patient is due for the following:   Breast Cancer Screening - Mammogram  Physical Annual Wellness Visit    Action(s) Taken:   Schedule a Annual Wellness Visit    Type of outreach:    Sent [employer] message.    Questions for provider review:    None         Shaista Rodríguez MA  Chart routed to None.

## 2025-08-25 ENCOUNTER — OFFICE VISIT (OUTPATIENT)
Dept: URGENT CARE | Facility: URGENT CARE | Age: 65
End: 2025-08-25
Payer: COMMERCIAL

## 2025-08-25 VITALS
DIASTOLIC BLOOD PRESSURE: 84 MMHG | HEIGHT: 61 IN | WEIGHT: 141 LBS | HEART RATE: 94 BPM | TEMPERATURE: 97.3 F | SYSTOLIC BLOOD PRESSURE: 135 MMHG | BODY MASS INDEX: 26.62 KG/M2 | RESPIRATION RATE: 18 BRPM | OXYGEN SATURATION: 99 %

## 2025-08-25 DIAGNOSIS — M25.562 CHRONIC PAIN OF BOTH KNEES: Primary | ICD-10-CM

## 2025-08-25 DIAGNOSIS — G89.29 CHRONIC PAIN OF BOTH KNEES: Primary | ICD-10-CM

## 2025-08-25 DIAGNOSIS — M25.561 CHRONIC PAIN OF BOTH KNEES: Primary | ICD-10-CM

## 2025-08-25 PROCEDURE — 99213 OFFICE O/P EST LOW 20 MIN: CPT | Performed by: EMERGENCY MEDICINE

## 2025-08-25 ASSESSMENT — PAIN SCALES - GENERAL: PAINLEVEL_OUTOF10: SEVERE PAIN (8)

## 2025-08-30 ENCOUNTER — HEALTH MAINTENANCE LETTER (OUTPATIENT)
Age: 65
End: 2025-08-30